# Patient Record
Sex: FEMALE | Race: WHITE
[De-identification: names, ages, dates, MRNs, and addresses within clinical notes are randomized per-mention and may not be internally consistent; named-entity substitution may affect disease eponyms.]

---

## 2018-02-26 ENCOUNTER — HOSPITAL ENCOUNTER (EMERGENCY)
Dept: HOSPITAL 74 - FER | Age: 18
Discharge: HOME | End: 2018-02-26
Payer: COMMERCIAL

## 2018-02-26 VITALS — SYSTOLIC BLOOD PRESSURE: 128 MMHG | HEART RATE: 66 BPM | DIASTOLIC BLOOD PRESSURE: 84 MMHG | TEMPERATURE: 98.8 F

## 2018-02-26 VITALS — BODY MASS INDEX: 51.5 KG/M2

## 2018-02-26 DIAGNOSIS — N93.8: Primary | ICD-10-CM

## 2018-02-26 LAB
COLOR UR: YELLOW
EPITH CASTS URNS QL MICRO: (no result) /HPF
HCG UR QL: NEGATIVE
PH UR: 5.5 [PH] (ref 4.5–8)
PROT UR QL STRIP: (no result)
RBC # BLD AUTO: >100 /HPF (ref 0–3)
RBC # UR STRIP: (no result) /UL
SP GR UR: 1.01 (ref 1–1.02)
UROBILINOGEN UR STRIP-MCNC: 0.2 MG/DL (ref 0.2–1)
WBC # UR AUTO: (no result) /UL (ref 0–5)

## 2018-02-26 NOTE — PDOC
History of Present Illness





- History of Present Illness


Initial Comments: 





02/26/18 15:41


The patient is a 17 year old female with no known past medical history who 

presents to the ED complaining of vaginal bleeding that began today. The 

patient states she completed her most recent menstrual period. Today, she "woke 

up in a pool of blood". States that she soaked through her pad and noted a 

large blood clot. She also reports associated mild suprapubic cramping that is 

consistent with previous periods. No fever or chills. No nausea, vomiting, or 

diarrhea. No urinary complaints. She states she does have a history of 

irregular periods. She is sexually active with one partner and uses condoms.





<Doris Buckner - Last Filed: 02/26/18 17:49>





- General


History Source: Patient


Exam Limitations: No Limitations





<Aleksandar Junior - Last Filed: 02/26/18 17:57>





- General


Chief Complaint: Vaginal Bleeding


Stated Complaint: VAGINAL BLEEDING


Time Seen by Provider: 02/26/18 14:29





Past History





<Doris Buckner - Last Filed: 02/26/18 17:49>





- Past Medical History


COPD: No





- Reproductive History


Is Patient Pregnant Now?: No





- Immunization History


Immunization Up to Date: Yes





- Suicide/Smoking/Psychosocial Hx


Smoking History: Never smoked


Hx Alcohol Use: Yes (OCCASIONAL)


Drug/Substance Use Hx: No


Substance Use Type: None





<Aleksandar Junior - Last Filed: 02/26/18 17:57>





- Past Medical History


Allergies/Adverse Reactions: 


 Allergies











Allergy/AdvReac Type Severity Reaction Status Date / Time


 


No Known Allergies Allergy   Verified 02/26/18 15:35











Home Medications: 


Ambulatory Orders





NK [No Known Home Medication]  02/26/18 











**Review of Systems





- Review of Systems


Able to Perform ROS?: Yes


Comments:: 





02/26/18 15:49


GENERAL/CONSTITUTIONAL: No fever or chills. No weakness.


HEAD, EYES, EARS, NOSE AND THROAT: No change in vision. No ear pain or 

discharge. No sore throat.


CARDIOVASCULAR: No chest pain or shortness of breath.


RESPIRATORY: No cough, wheezing, or hemoptysis.


GASTROINTESTINAL: No nausea, vomiting, diarrhea or constipation.


GENITOURINARY: +Vaginal bleeding x 1 day. Mild suprapubic ttp. No dysuria, 

frequency, or change in urination.


MUSCULOSKELETAL: No joint or muscle swelling or pain. No neck or back pain.


SKIN: No rash


NEUROLOGIC: No headache, vertigo, loss of consciousness, or change in strength/

sensation.


ENDOCRINE: No increased thirst. No abnormal weight change.


HEMATOLOGIC/LYMPHATIC: No anemia, easy bleeding, or history of blood clots.


ALLERGIC/IMMUNOLOGIC: No hives or skin allergy.








<Doris Buckner - Last Filed: 02/26/18 17:49>





*Physical Exam





- Vital Signs


 Last Vital Signs











Temp Pulse Resp BP Pulse Ox


 


 98.8 F   66   16   128/84   99 


 


 02/26/18 14:29  02/26/18 14:29  02/26/18 14:29  02/26/18 14:29  02/26/18 14:29














- Physical Exam


Comments: 








02/26/18 15:50





GENERAL: Awake, alert, and fully oriented, in no acute distress. Obese. 


HEAD: No signs of trauma


EYES: PERRLA, EOMI, sclera anicteric, conjunctiva clear


ENT: Auricles normal inspection, hearing grossly normal, nares patent, 

oropharynx clear without exudates. Moist mucosa


NECK: Normal ROM, supple, no lymphadenopathy, JVD, or masses


ABDOMEN: Soft, nontender, normoactive bowel sounds.  No guarding, no rebound.  

No masses


EXTREMITIES: Normal range of motion, no edema.  No clubbing or cyanosis. No 

cords, erythema, or tenderness


NEUROLOGICAL: Cranial nerves II through XII grossly intact.  Normal speech, 

normal gait


SKIN: Warm, Dry, normal turgor, no rashes or lesions noted.


PELVIC: Small amounts of blood in vaginal vault, no CMT or adnexal tenderness. 

Unable to reach cervical os. 





<Doris Buckner - Last Filed: 02/26/18 17:49>





- Vital Signs


 Last Vital Signs











Temp Pulse Resp BP Pulse Ox


 


 98.8 F   66   16   128/84   99 


 


 02/26/18 14:29  02/26/18 14:29  02/26/18 14:29  02/26/18 14:29  02/26/18 14:29














<Aleksandar Junior - Last Filed: 02/26/18 17:57>





ED Treatment Course





- ADDITIONAL ORDERS


Additional order review: 


 Laboratory  Results











  02/26/18





  15:00


 


Urine Color  Yellow


 


Urine Appearance  Slightly


 


Urine pH  5.5


 


Ur Specific Gravity  1.015


 


Urine Protein  1+ H


 


Urine Glucose (UA)  Negative


 


Urine Ketones  Negative


 


Urine Blood  3+ H


 


Urine Nitrite  Negative


 


Urine Bilirubin  Negative


 


Urine Urobilinogen  0.2


 


Ur Leukocyte Esterase  Negative


 


Urine RBC  >100


 


Urine WBC  3-5


 


Ur Epithelial Cells  3-5


 


Urine HCG, Qual  Negative














<Doris Buckner - Last Filed: 02/26/18 17:49>





- ADDITIONAL ORDERS


Additional order review: 


 Laboratory  Results











  02/26/18





  15:00


 


Urine Color  Yellow


 


Urine Appearance  Slightly


 


Urine pH  5.5


 


Ur Specific Gravity  1.015


 


Urine Protein  1+ H


 


Urine Glucose (UA)  Negative


 


Urine Ketones  Negative


 


Urine Blood  3+ H


 


Urine Nitrite  Negative


 


Urine Bilirubin  Negative


 


Urine Urobilinogen  0.2


 


Ur Leukocyte Esterase  Negative


 


Urine RBC  >100


 


Urine WBC  3-5


 


Ur Epithelial Cells  3-5


 


Urine HCG, Qual  Negative














- RADIOLOGY


Radiology Studies Ordered: 














 Category Date Time Status


 


 PELVIC / BLADDER US [US] Routine Ultrasound  02/26/18 Ordered


 


 TRANSVAGINAL ULTRASOUND US [US] Stat Ultrasound  02/26/18 15:21 Ordered














<Aleksandar Junior - Last Filed: 02/26/18 17:57>





Medical Decision Making





- Medical Decision Making








02/26/18 17:49


Pelvic and Transvaginal US. 


Read and reviewed by Dr. Zafar. 


Impression: Left ovarian cysts and free fluid. 





<Doris Buckner - Last Filed: 02/26/18 17:49>





- Medical Decision Making





02/26/18 15:34





A portion of this note was documented by scribe services under my direction. I 

have reviewed the details of the note, within reason, and agree with the 

documentation with the following case summary and management plan written by 

me. 





Patient treated in the ED.





Nursing notes are reviewed and incorporated into the medical decision-making.


Vital signs reviewed.





Peripheral IV access obtained by the nurse, laboratory studies are drawn and 

sent, reviewed and interpreted by myself. 





 Vital Signs











Temp Pulse Resp BP Pulse Ox


 


 98.8 F   66   16   128/84   99 


 


 02/26/18 14:29  02/26/18 14:29  02/26/18 14:29  02/26/18 14:29  02/26/18 14:29








17-year-old female with past medical history obesity presents with vaginal 

bleeding. The patient has had irregular periods. She had recently finished her 

last menstrual period 3 days ago. Today, she noted heavy vaginal bleeding 

including a large blood clot. Reports uterine like cramp symptoms. Denies 

dysuria. Patient essentially active with her partner but wears condoms 

consistently.





The patient is not pregnant. This may be possible this is dysfunctional uterine 

bleeding. We'll obtain a transvaginal ultrasound and if the workup is 

unremarkable, we'll discharge patient with GYN follow-up.


02/26/18 17:53





 Urine Test Results











Urine Color  Yellow   02/26/18  15:00    


 


Urine Appearance  Slightly   02/26/18  15:00    


 


Urine pH  5.5  (4.5-8)   02/26/18  15:00    


 


Ur Specific Gravity  1.015  (1.005-1.025)   02/26/18  15:00    


 


Urine Protein  1+  (NEGATIVE)  H  02/26/18  15:00    


 


Urine Glucose (UA)  Negative  (NEGATIVE)   02/26/18  15:00    


 


Urine Ketones  Negative  (NEGATIVE)   02/26/18  15:00    


 


Urine Blood  3+  (NEGATIVE)  H  02/26/18  15:00    


 


Urine Nitrite  Negative  (NEGATIVE)   02/26/18  15:00    


 


Urine Bilirubin  Negative  (NEGATIVE)   02/26/18  15:00    


 


Ur Leukocyte Esterase  Negative  (NEGATIVE)   02/26/18  15:00    


 


Urine RBC  >100 /hpf (0-3)   02/26/18  15:00    


 


Urine WBC  3-5  (0-5)   02/26/18  15:00    


 


Ur Epithelial Cells  3-5 /HPF  02/26/18  15:00    








Pregnancy test negative.


Transvaginal ultrasound shows ovarian cysts.


The patient is comfortable and feels reassured.


I had explained that she will need follow up with ob/gyn.


Discharge diagnosis: ovarian cysts, dysfunction uterine bleeding.





I discussed the physical exam findings, ancillary test results and final 

diagnoses with the patient.  I answered all of the patient's questions.  The 

patient was satisfied with the care received and felt comfortable with the 

discharge plan and treatment plan.  The patient will call their primary care 

physician within 24 hours to arrange follow-up and will return to the Emergency 

Department with any new, persistant or worsening symptoms. 





<Aleksandar Junior - Last Filed: 02/26/18 17:57>





*DC/Admit/Observation/Transfer





- Attestations


Scribe Attestion: 





02/26/18 15:50





Documentation prepared by Doris Buckner, acting as medical scribe for Aleksandar Junior MD.





<Doris Buckner - Last Filed: 02/26/18 17:49>





- Discharge Dispostion


Admit: No





<Aleksandar Junior - Last Filed: 02/26/18 17:57>


Diagnosis at time of Disposition: 


 Dysfunctional uterine bleeding








- Discharge Dispostion


Disposition: HOME


Condition at time of disposition: Good





- Referrals


Referrals: 


George Martinez MD [Staff Physician] - 


Mary Milan MD [Staff Physician] - 





- Patient Instructions


Printed Discharge Instructions:  DI for Abnormal Uterine Bleeding, DI for 

Ovarian Cyst


Additional Instructions: 


You may take 600 mg motrin (ibuprofen) every 6 hours as needed for pain.


You are not pregnant. 


Please follow up with a GYN doctor. Call to schedule an appointment.

## 2020-07-31 ENCOUNTER — APPOINTMENT (OUTPATIENT)
Dept: SURGERY | Facility: CLINIC | Age: 20
End: 2020-07-31
Payer: MEDICAID

## 2020-07-31 VITALS
SYSTOLIC BLOOD PRESSURE: 102 MMHG | WEIGHT: 293 LBS | TEMPERATURE: 97.6 F | DIASTOLIC BLOOD PRESSURE: 80 MMHG | HEART RATE: 65 BPM | HEIGHT: 65 IN | BODY MASS INDEX: 48.82 KG/M2

## 2020-07-31 DIAGNOSIS — R51 HEADACHE: ICD-10-CM

## 2020-07-31 DIAGNOSIS — M54.5 LOW BACK PAIN: ICD-10-CM

## 2020-07-31 DIAGNOSIS — Z78.9 OTHER SPECIFIED HEALTH STATUS: ICD-10-CM

## 2020-07-31 PROCEDURE — 99204 OFFICE O/P NEW MOD 45 MIN: CPT

## 2020-07-31 RX ORDER — ALBUTEROL SULFATE 90 UG/1
INHALANT RESPIRATORY (INHALATION) EVERY 4 HOURS
Refills: 0 | Status: ACTIVE | COMMUNITY

## 2020-08-06 NOTE — ASSESSMENT
[FreeTextEntry1] : ELISE CUEVAS is a 19 year female seen today for Bariatric consultation. The surgical options for weight loss have been extensively discussed with the patient and questions answered. The patient was provided written  education regarding the sleeve gastrectomy. The patient appears to have a reasonable understanding of the process and is ready to proceed.  Risks, including but not limited to:  anesthesia, death, bleeding, infection, leaks, blood clots, ulcers, malnutrition and need for additional operations have been reviewed.  The importance of a consistent diet and exercise regimen in regards to weight loss and maintenance has also been discussed and the patient agrees to actively participate in the process.  The importance of lifelong mineral and vitamin supplementation was also reviewed with the patient. The need to adhere to an appropriate diet and exercise regimen were emphasized; in particular the need to perform moderate to intense physical activity most days of the week.  No promises have been made in regards to any given outcome and possibility of inadequate weight loss as well as regain has been discussed with the patient.  The patient understands that a long-term commitment is necessary for long-term success.\par

## 2020-08-06 NOTE — PHYSICAL EXAM
[Normal] : affect appropriate [de-identified] : Mallampati II [de-identified] : Soft, nondistended.

## 2020-08-06 NOTE — PLAN
[FreeTextEntry1] : PLAN: Education class.\par            Nutritional evaluation.\par            Call with concerns.

## 2020-08-06 NOTE — CONSULT LETTER
[Dear  ___] : Dear  [unfilled], [Courtesy Letter:] : I had the pleasure of seeing your patient, [unfilled], in my office today. [Please see my note below.] : Please see my note below. [Consult Closing:] : Thank you very much for allowing me to participate in the care of this patient.  If you have any questions, please do not hesitate to contact me. [Sincerely,] : Sincerely, [FreeTextEntry3] : Kristina Rosado MD, FACS, FASMBS, Diplo ABOM\par Director, [facility]\par Director, Medical [facility]\par Chief, General, Bariatric & Minimally Invasive Surgery\par Director, Quality & Performance Improvement\par Department of Surgery\par [facility] \par [facility]\par

## 2020-08-06 NOTE — REASON FOR VISIT
[Initial Consult] : an initial consult for [Morbid Obesity (BMI>40)] : morbid obesity (bmi>40) [FreeTextEntry2] : Patient presents for bariatric consultation

## 2020-08-06 NOTE — HISTORY OF PRESENT ILLNESS
[de-identified] : 19 year old female with a BMI of 54 who presents today for Bariatric consultation. She has tried multiple weight loss modalities in the past such as portion control and approximately 1 month ago she was placed on a 1500 k/katherine day diet by her nutritionist to help her with her weight loss goals. She is also exercising daily for 30 minutes using an exercise application. \par \par \par

## 2020-09-02 ENCOUNTER — APPOINTMENT (OUTPATIENT)
Dept: SURGERY | Facility: CLINIC | Age: 20
End: 2020-09-02

## 2020-09-03 ENCOUNTER — APPOINTMENT (OUTPATIENT)
Dept: SURGERY | Facility: CLINIC | Age: 20
End: 2020-09-03

## 2020-09-16 ENCOUNTER — APPOINTMENT (OUTPATIENT)
Dept: SURGERY | Facility: CLINIC | Age: 20
End: 2020-09-16

## 2020-09-23 ENCOUNTER — APPOINTMENT (OUTPATIENT)
Dept: SURGERY | Facility: CLINIC | Age: 20
End: 2020-09-23
Payer: SELF-PAY

## 2020-09-23 PROCEDURE — SI006: CPT

## 2020-09-24 ENCOUNTER — APPOINTMENT (OUTPATIENT)
Dept: SURGERY | Facility: CLINIC | Age: 20
End: 2020-09-24

## 2020-09-24 ENCOUNTER — OUTPATIENT (OUTPATIENT)
Dept: OUTPATIENT SERVICES | Facility: HOSPITAL | Age: 20
LOS: 1 days | Discharge: HOME | End: 2020-09-24

## 2020-09-24 DIAGNOSIS — F50.9 EATING DISORDER, UNSPECIFIED: ICD-10-CM

## 2020-10-01 DIAGNOSIS — F50.9 EATING DISORDER, UNSPECIFIED: ICD-10-CM

## 2020-10-05 ENCOUNTER — APPOINTMENT (OUTPATIENT)
Dept: SURGERY | Facility: CLINIC | Age: 20
End: 2020-10-05

## 2020-11-05 ENCOUNTER — APPOINTMENT (OUTPATIENT)
Dept: SURGERY | Facility: CLINIC | Age: 20
End: 2020-11-05
Payer: MEDICAID

## 2020-11-05 VITALS — WEIGHT: 293 LBS | HEIGHT: 65 IN | BODY MASS INDEX: 48.82 KG/M2

## 2020-11-05 PROCEDURE — ZZZZZ: CPT

## 2020-11-19 ENCOUNTER — TRANSCRIPTION ENCOUNTER (OUTPATIENT)
Age: 20
End: 2020-11-19

## 2020-12-14 ENCOUNTER — APPOINTMENT (OUTPATIENT)
Dept: SURGERY | Facility: CLINIC | Age: 20
End: 2020-12-14
Payer: MEDICAID

## 2020-12-14 VITALS — HEIGHT: 65 IN | WEIGHT: 293 LBS | BODY MASS INDEX: 48.82 KG/M2

## 2020-12-14 PROCEDURE — ZZZZZ: CPT

## 2021-01-29 ENCOUNTER — APPOINTMENT (OUTPATIENT)
Dept: SURGERY | Facility: CLINIC | Age: 21
End: 2021-01-29
Payer: MEDICAID

## 2021-01-29 VITALS — HEIGHT: 65 IN | BODY MASS INDEX: 48.82 KG/M2 | WEIGHT: 293 LBS

## 2021-01-29 PROCEDURE — ZZZZZ: CPT

## 2021-02-16 ENCOUNTER — APPOINTMENT (OUTPATIENT)
Dept: SURGERY | Facility: CLINIC | Age: 21
End: 2021-02-16
Payer: MEDICAID

## 2021-02-16 VITALS — BODY MASS INDEX: 48.82 KG/M2 | HEIGHT: 65 IN | WEIGHT: 293 LBS

## 2021-02-16 DIAGNOSIS — Z78.9 OTHER SPECIFIED HEALTH STATUS: ICD-10-CM

## 2021-02-16 PROCEDURE — ZZZZZ: CPT

## 2021-02-24 ENCOUNTER — NON-APPOINTMENT (OUTPATIENT)
Age: 21
End: 2021-02-24

## 2021-03-18 ENCOUNTER — APPOINTMENT (OUTPATIENT)
Dept: SURGERY | Facility: CLINIC | Age: 21
End: 2021-03-18
Payer: MEDICAID

## 2021-03-18 VITALS — HEIGHT: 65 IN | BODY MASS INDEX: 48.82 KG/M2 | WEIGHT: 293 LBS

## 2021-03-18 PROCEDURE — ZZZZZ: CPT

## 2021-04-19 ENCOUNTER — NON-APPOINTMENT (OUTPATIENT)
Age: 21
End: 2021-04-19

## 2021-04-19 DIAGNOSIS — R16.0 HEPATOMEGALY, NOT ELSEWHERE CLASSIFIED: ICD-10-CM

## 2021-04-29 ENCOUNTER — APPOINTMENT (OUTPATIENT)
Dept: SURGERY | Facility: CLINIC | Age: 21
End: 2021-04-29
Payer: MEDICAID

## 2021-04-29 VITALS — WEIGHT: 293 LBS | BODY MASS INDEX: 48.82 KG/M2 | HEIGHT: 65 IN

## 2021-04-29 PROCEDURE — ZZZZZ: CPT

## 2021-08-17 ENCOUNTER — OUTPATIENT (OUTPATIENT)
Dept: OUTPATIENT SERVICES | Facility: HOSPITAL | Age: 21
LOS: 1 days | Discharge: HOME | End: 2021-08-17
Payer: MEDICAID

## 2021-08-17 DIAGNOSIS — E66.01 MORBID (SEVERE) OBESITY DUE TO EXCESS CALORIES: ICD-10-CM

## 2021-08-17 PROCEDURE — 74240 X-RAY XM UPR GI TRC 1CNTRST: CPT | Mod: 26

## 2021-09-23 ENCOUNTER — APPOINTMENT (OUTPATIENT)
Dept: SURGERY | Facility: CLINIC | Age: 21
End: 2021-09-23
Payer: SELF-PAY

## 2021-09-23 VITALS
HEIGHT: 65 IN | TEMPERATURE: 97.8 F | DIASTOLIC BLOOD PRESSURE: 76 MMHG | WEIGHT: 293 LBS | HEART RATE: 77 BPM | BODY MASS INDEX: 48.82 KG/M2 | OXYGEN SATURATION: 98 % | SYSTOLIC BLOOD PRESSURE: 122 MMHG

## 2021-09-23 PROCEDURE — 99213 OFFICE O/P EST LOW 20 MIN: CPT

## 2021-09-23 NOTE — HISTORY OF PRESENT ILLNESS
[de-identified] : Drea is preparing for bariatric surgery, specifically laparoscopic sleeve gastrectomy.  She was diagnosed with H pylori via a stool antigen test and is currently taking antibiotics for that.  She had an MRI which showed focal nodular hyperplasia in her liver.

## 2021-09-23 NOTE — PLAN
[FreeTextEntry1] : Pt needs hematology visit for anemia.  She was told to take iron but has not started yet.\par Needs EGD\par F/u with our nutritionist for weight gain\par

## 2021-10-08 ENCOUNTER — APPOINTMENT (OUTPATIENT)
Dept: HEMATOLOGY ONCOLOGY | Facility: CLINIC | Age: 21
End: 2021-10-08
Payer: MEDICAID

## 2021-10-08 ENCOUNTER — LABORATORY RESULT (OUTPATIENT)
Age: 21
End: 2021-10-08

## 2021-10-08 VITALS
RESPIRATION RATE: 14 BRPM | WEIGHT: 293 LBS | BODY MASS INDEX: 48.82 KG/M2 | SYSTOLIC BLOOD PRESSURE: 119 MMHG | DIASTOLIC BLOOD PRESSURE: 57 MMHG | HEIGHT: 65 IN | TEMPERATURE: 98.4 F | HEART RATE: 73 BPM

## 2021-10-08 LAB
HCT VFR BLD CALC: 35.6 %
HGB BLD-MCNC: 10.9 G/DL
MCHC RBC-ENTMCNC: 23.9 PG
MCHC RBC-ENTMCNC: 30.6 G/DL
MCV RBC AUTO: 78.1 FL
PLATELET # BLD AUTO: 219 K/UL
PMV BLD: 11.2 FL
RBC # BLD: 4.56 M/UL
RBC # FLD: 15 %
RETICS # AUTO: 1.3 %
RETICS AGGREG/RBC NFR: 58.8 K/UL
WBC # FLD AUTO: 8.22 K/UL

## 2021-10-08 PROCEDURE — 99214 OFFICE O/P EST MOD 30 MIN: CPT

## 2021-10-09 LAB
FERRITIN SERPL-MCNC: 9 NG/ML
VIT B12 SERPL-MCNC: 446 PG/ML

## 2021-10-09 NOTE — HISTORY OF PRESENT ILLNESS
[de-identified] : 20 year old white female referred for hematologic evaluation for history of anemia . PMH is significant for morbid obesity , asthma , hepatomegaly with focal nodular hyperplasia ,  she is referred for evaluation prior to gastric sleeve resection . She has mild microcytic anemia noted on routine blood work ( Hgb 10 ) , she reports moderate menstrual bleeding . she denies hematochezia . She was previously on iron gummies , no recent iron intake . She tested positive for H pylori and is awaiting EGD.

## 2021-10-09 NOTE — ASSESSMENT
[FreeTextEntry1] : 20 year old female with mild microcytic anemia likely from iron deficiency referred for treatment prior to elective bariatric surgery . \par Positive H pylori . \par Plan : will check ferritin and start on iron infusions as needed to correct her anemia prior to surgery .She was apprised of potential adverse effects mainly mild hypersensitivity .

## 2021-10-14 LAB — METHYLMALONATE SERPL-SCNC: 104 NMOL/L

## 2021-10-18 ENCOUNTER — APPOINTMENT (OUTPATIENT)
Dept: GASTROENTEROLOGY | Facility: CLINIC | Age: 21
End: 2021-10-18
Payer: COMMERCIAL

## 2021-10-18 PROCEDURE — 99203 OFFICE O/P NEW LOW 30 MIN: CPT

## 2021-10-18 NOTE — ASSESSMENT
[FreeTextEntry1] : 19 yo morbidly obese F on a bariatric surgery program HP + then - after treatment. FNH on liver also has anemia. Unclear if INO. If Ino may consider colonoscopy (if recommended by hematology)\par \par Plan:\par EGD prior to bariatric surgery\par Follow up after

## 2021-10-18 NOTE — HISTORY OF PRESENT ILLNESS
[Home] : at home, [unfilled] , at the time of the visit. [Verbal consent obtained from patient] : the patient, [unfilled] [FreeTextEntry4] : PHILIP [de-identified] : 21 yo morbidly obese F on a bariatric surgery program HP + then - after treatment. FNH on liver also has anemia. Unclear if INO. Denies pain hematemesis, hematochezia, melena.\par FMHx: CRC in maternal grandma at 75\par \par No complaint

## 2021-10-20 ENCOUNTER — APPOINTMENT (OUTPATIENT)
Dept: INFUSION THERAPY | Facility: CLINIC | Age: 21
End: 2021-10-20

## 2021-10-20 RX ORDER — IRON SUCROSE 20 MG/ML
200 INJECTION, SOLUTION INTRAVENOUS ONCE
Refills: 0 | Status: COMPLETED | OUTPATIENT
Start: 2021-10-20 | End: 2021-10-20

## 2021-10-20 RX ADMIN — IRON SUCROSE 110 MILLIGRAM(S): 20 INJECTION, SOLUTION INTRAVENOUS at 16:31

## 2021-10-27 ENCOUNTER — APPOINTMENT (OUTPATIENT)
Dept: INFUSION THERAPY | Facility: CLINIC | Age: 21
End: 2021-10-27

## 2021-10-27 VITALS
TEMPERATURE: 97 F | RESPIRATION RATE: 18 BRPM | SYSTOLIC BLOOD PRESSURE: 122 MMHG | HEART RATE: 68 BPM | DIASTOLIC BLOOD PRESSURE: 73 MMHG

## 2021-10-27 RX ORDER — IRON SUCROSE 20 MG/ML
200 INJECTION, SOLUTION INTRAVENOUS ONCE
Refills: 0 | Status: COMPLETED | OUTPATIENT
Start: 2021-10-27 | End: 2021-10-27

## 2021-10-27 RX ADMIN — IRON SUCROSE 200 MILLIGRAM(S): 20 INJECTION, SOLUTION INTRAVENOUS at 13:50

## 2021-10-27 RX ADMIN — IRON SUCROSE 110 MILLIGRAM(S): 20 INJECTION, SOLUTION INTRAVENOUS at 13:20

## 2021-11-03 ENCOUNTER — APPOINTMENT (OUTPATIENT)
Dept: INFUSION THERAPY | Facility: CLINIC | Age: 21
End: 2021-11-03

## 2021-11-03 ENCOUNTER — OUTPATIENT (OUTPATIENT)
Dept: OUTPATIENT SERVICES | Facility: HOSPITAL | Age: 21
LOS: 1 days | Discharge: HOME | End: 2021-11-03

## 2021-11-03 DIAGNOSIS — D64.9 ANEMIA, UNSPECIFIED: ICD-10-CM

## 2021-11-03 RX ORDER — IRON SUCROSE 20 MG/ML
200 INJECTION, SOLUTION INTRAVENOUS ONCE
Refills: 0 | Status: COMPLETED | OUTPATIENT
Start: 2021-11-03 | End: 2021-11-03

## 2021-11-03 RX ADMIN — IRON SUCROSE 110 MILLIGRAM(S): 20 INJECTION, SOLUTION INTRAVENOUS at 14:17

## 2021-11-17 ENCOUNTER — APPOINTMENT (OUTPATIENT)
Dept: SURGERY | Facility: CLINIC | Age: 21
End: 2021-11-17
Payer: MEDICAID

## 2021-11-17 VITALS
HEIGHT: 65 IN | OXYGEN SATURATION: 98 % | HEART RATE: 74 BPM | DIASTOLIC BLOOD PRESSURE: 78 MMHG | SYSTOLIC BLOOD PRESSURE: 118 MMHG | TEMPERATURE: 97.2 F | WEIGHT: 293 LBS | BODY MASS INDEX: 48.82 KG/M2

## 2021-11-17 PROCEDURE — ZZZZZ: CPT

## 2021-12-21 ENCOUNTER — OUTPATIENT (OUTPATIENT)
Dept: OUTPATIENT SERVICES | Facility: HOSPITAL | Age: 21
LOS: 1 days | Discharge: HOME | End: 2021-12-21

## 2021-12-21 ENCOUNTER — APPOINTMENT (OUTPATIENT)
Dept: NEUROPSYCHOLOGY | Facility: CLINIC | Age: 21
End: 2021-12-21

## 2021-12-21 DIAGNOSIS — F50.9 EATING DISORDER, UNSPECIFIED: ICD-10-CM

## 2021-12-22 DIAGNOSIS — F50.9 EATING DISORDER, UNSPECIFIED: ICD-10-CM

## 2022-01-08 ENCOUNTER — LABORATORY RESULT (OUTPATIENT)
Age: 22
End: 2022-01-08

## 2022-01-10 ENCOUNTER — TRANSCRIPTION ENCOUNTER (OUTPATIENT)
Age: 22
End: 2022-01-10

## 2022-01-10 ENCOUNTER — OUTPATIENT (OUTPATIENT)
Dept: OUTPATIENT SERVICES | Facility: HOSPITAL | Age: 22
LOS: 1 days | Discharge: HOME | End: 2022-01-10
Payer: MEDICAID

## 2022-01-10 ENCOUNTER — RESULT REVIEW (OUTPATIENT)
Age: 22
End: 2022-01-10

## 2022-01-10 VITALS
SYSTOLIC BLOOD PRESSURE: 108 MMHG | DIASTOLIC BLOOD PRESSURE: 60 MMHG | WEIGHT: 293 LBS | HEART RATE: 79 BPM | HEIGHT: 65 IN | RESPIRATION RATE: 18 BRPM | OXYGEN SATURATION: 98 % | TEMPERATURE: 98 F

## 2022-01-10 VITALS — DIASTOLIC BLOOD PRESSURE: 69 MMHG | SYSTOLIC BLOOD PRESSURE: 101 MMHG | RESPIRATION RATE: 17 BRPM | HEART RATE: 72 BPM

## 2022-01-10 DIAGNOSIS — Z90.89 ACQUIRED ABSENCE OF OTHER ORGANS: Chronic | ICD-10-CM

## 2022-01-10 PROCEDURE — 88312 SPECIAL STAINS GROUP 1: CPT | Mod: 26

## 2022-01-10 PROCEDURE — 43239 EGD BIOPSY SINGLE/MULTIPLE: CPT

## 2022-01-10 PROCEDURE — 88305 TISSUE EXAM BY PATHOLOGIST: CPT | Mod: 26

## 2022-01-10 NOTE — ASU DISCHARGE PLAN (ADULT/PEDIATRIC) - CARE PROVIDER_API CALL
Sarai Plascencia)  Gastroenterology; Internal Medicine  [address] [place] [id number]  Phone: [phone]  Fax: [phone]  Follow Up Time:

## 2022-01-10 NOTE — ASU PATIENT PROFILE, ADULT - FALL HARM RISK - HARM RISK INTERVENTIONS

## 2022-01-10 NOTE — ASU DISCHARGE PLAN (ADULT/PEDIATRIC) - NS MD DC FALL RISK RISK
For information on Fall & Injury Prevention, visit: [email or web address] OR  [email or web address] OR  https://www.cdc.gov/steadi/patient.html

## 2022-01-12 ENCOUNTER — NON-APPOINTMENT (OUTPATIENT)
Age: 22
End: 2022-01-12

## 2022-01-12 LAB — SURGICAL PATHOLOGY STUDY: SIGNIFICANT CHANGE UP

## 2022-01-14 DIAGNOSIS — K29.50 UNSPECIFIED CHRONIC GASTRITIS WITHOUT BLEEDING: ICD-10-CM

## 2022-01-14 DIAGNOSIS — D64.9 ANEMIA, UNSPECIFIED: ICD-10-CM

## 2022-01-14 DIAGNOSIS — K29.80 DUODENITIS WITHOUT BLEEDING: ICD-10-CM

## 2022-01-14 DIAGNOSIS — B96.81 HELICOBACTER PYLORI [H. PYLORI] AS THE CAUSE OF DISEASES CLASSIFIED ELSEWHERE: ICD-10-CM

## 2022-01-14 DIAGNOSIS — E66.9 OBESITY, UNSPECIFIED: ICD-10-CM

## 2022-01-19 ENCOUNTER — APPOINTMENT (OUTPATIENT)
Dept: SURGERY | Facility: CLINIC | Age: 22
End: 2022-01-19
Payer: SELF-PAY

## 2022-01-19 VITALS
OXYGEN SATURATION: 98 % | HEIGHT: 65 IN | HEART RATE: 98 BPM | BODY MASS INDEX: 48.82 KG/M2 | WEIGHT: 293 LBS | SYSTOLIC BLOOD PRESSURE: 110 MMHG | TEMPERATURE: 97.5 F | DIASTOLIC BLOOD PRESSURE: 74 MMHG

## 2022-01-19 PROCEDURE — 99213 OFFICE O/P EST LOW 20 MIN: CPT

## 2022-01-19 NOTE — PLAN
[FreeTextEntry1] : Will repeat labs, as the most recent are form last year and she has since completed Venofer infusions.\par F/u with Dr. Mckeon for persistent H pylori\par Pt will need to use the hospital PAP equipment as per her pulmonologist

## 2022-01-19 NOTE — HISTORY OF PRESENT ILLNESS
[de-identified] : Drea has lost 5 pounds since her last visit with us.  She completed her iron infusions.  She had an upper endoscopy and the pathology revealed H pylori, despite the fact that she completed a course of po Abx for stool (+) H pylori a few months ago.  She has a f/u appointment with Dr. Mckeon in February.  She repeated her psychology evaluation...the completed evaluation is pending.

## 2022-02-10 ENCOUNTER — APPOINTMENT (OUTPATIENT)
Dept: GASTROENTEROLOGY | Facility: CLINIC | Age: 22
End: 2022-02-10
Payer: MEDICAID

## 2022-02-10 DIAGNOSIS — A04.8 OTHER SPECIFIED BACTERIAL INTESTINAL INFECTIONS: ICD-10-CM

## 2022-02-10 PROCEDURE — 99443: CPT

## 2022-02-22 RX ORDER — ERGOCALCIFEROL 1.25 MG/1
1.25 MG CAPSULE, LIQUID FILLED ORAL
Qty: 4 | Refills: 1 | Status: ACTIVE | COMMUNITY
Start: 2022-02-22 | End: 1900-01-01

## 2022-03-06 ENCOUNTER — NON-APPOINTMENT (OUTPATIENT)
Age: 22
End: 2022-03-06

## 2022-03-09 PROBLEM — A04.8 HELICOBACTER PYLORI (H. PYLORI) INFECTION: Status: ACTIVE | Noted: 2021-09-23

## 2022-03-09 NOTE — HISTORY OF PRESENT ILLNESS
[Home] : at home, [unfilled] , at the time of the visit. [Medical Office: ([facility])___] : at the medical office located in  [Verbal consent obtained from patient] : the patient, [unfilled] [___ Month(s) Ago] : [unfilled] month(s) ago [_________] : Performed [unfilled] [FreeTextEntry4] : PHILIP [de-identified] : SP EGD prior to bariatric surgery. (+HP) on Bx [de-identified] : 20 yo morbidly obese F on a bariatric surgery program HP + then - after treatment. FNH on liver also has anemia. Unclear if INO. Denies pain hematemesis, hematochezia, melena.\par FMHx: CRC in maternal grandma at 75\par \par No complaint

## 2022-03-09 NOTE — ASSESSMENT
[FreeTextEntry1] : 22 yo morbidly obese F on a bariatric surgery program \par Resistant HP on repeat EGD\par FNH\par \par Plan:\par Will treat HP using Bismuth quadruple therapy\par Check HP Stool Ag then follow up

## 2022-03-10 LAB — H PYLORI AG STL QL: DETECTED

## 2022-04-28 ENCOUNTER — LABORATORY RESULT (OUTPATIENT)
Age: 22
End: 2022-04-28

## 2022-04-28 ENCOUNTER — APPOINTMENT (OUTPATIENT)
Dept: HEMATOLOGY ONCOLOGY | Facility: CLINIC | Age: 22
End: 2022-04-28
Payer: MEDICAID

## 2022-04-28 VITALS
DIASTOLIC BLOOD PRESSURE: 89 MMHG | BODY MASS INDEX: 48.82 KG/M2 | HEART RATE: 103 BPM | TEMPERATURE: 98.4 F | SYSTOLIC BLOOD PRESSURE: 135 MMHG | OXYGEN SATURATION: 98 % | RESPIRATION RATE: 18 BRPM | WEIGHT: 293 LBS | HEIGHT: 65 IN

## 2022-04-28 DIAGNOSIS — Z00.00 ENCOUNTER FOR GENERAL ADULT MEDICAL EXAMINATION W/OUT ABNORMAL FINDINGS: ICD-10-CM

## 2022-04-28 DIAGNOSIS — R79.89 OTHER SPECIFIED ABNORMAL FINDINGS OF BLOOD CHEMISTRY: ICD-10-CM

## 2022-04-28 PROCEDURE — 99213 OFFICE O/P EST LOW 20 MIN: CPT

## 2022-04-29 PROBLEM — R79.89 LOW VITAMIN D LEVEL: Status: ACTIVE | Noted: 2022-02-22

## 2022-04-30 LAB
FERRITIN SERPL-MCNC: 23 NG/ML
HCT VFR BLD CALC: 35.7 %
HGB BLD-MCNC: 11.1 G/DL
IRON SATN MFR SERPL: 7 %
IRON SERPL-MCNC: 23 UG/DL
MCHC RBC-ENTMCNC: 24.6 PG
MCHC RBC-ENTMCNC: 31.1 G/DL
MCV RBC AUTO: 79.2 FL
PLATELET # BLD AUTO: 240 K/UL
PMV BLD: 11.6 FL
RBC # BLD: 4.51 M/UL
RBC # FLD: 14.6 %
TIBC SERPL-MCNC: 326 UG/DL
UIBC SERPL-MCNC: 303 UG/DL
WBC # FLD AUTO: 10.49 K/UL

## 2022-05-02 NOTE — ASSESSMENT
[FreeTextEntry1] : 20 year old female with mild microcytic anemia likely from iron deficiency referred for treatment prior to elective bariatric surgery . \par Positive H pylori . \par Plan : will schedule for Feraheme weekly x2 \par           Recheck CBC and ferritin level post infusion.  \par         She was apprised of potential adverse effects mainly mild hypersensitivity . \par      Follow up with GI and general surgery for  Gastric sleeve surgery. \par Patient seen and evaluated by DR. Clemente who agreed for the above plan of care.

## 2022-05-02 NOTE — HISTORY OF PRESENT ILLNESS
[de-identified] : [birth year]:\par Patient follow up today for hematologic evaluation for history of Anemia . PMH is significant for morbid obesity , asthma , hepatomegaly with focal nodular hyperplasia ,  she is referred for evaluation prior to gastric sleeve resection . \par CBC reviewed HGB/ HCT 11.1/ 35.7 and normal platelet count is 240,000\par Serum IRON is 7%, We will schedule patient to start Feraheme next week for 2 doses. \par Patient cleared by GI. \par  [de-identified] : 20 year old white female referred for hematologic evaluation for history of anemia . PMH is significant for morbid obesity , asthma , hepatomegaly with focal nodular hyperplasia ,  she is referred for evaluation prior to gastric sleeve resection . She has mild microcytic anemia noted on routine blood work ( Hgb 10 ) , she reports moderate menstrual bleeding . she denies hematochezia . She was previously on iron gummies , no recent iron intake . She tested positive for H pylori and is awaiting EGD.

## 2022-05-10 ENCOUNTER — APPOINTMENT (OUTPATIENT)
Dept: INFUSION THERAPY | Facility: CLINIC | Age: 22
End: 2022-05-10

## 2022-05-10 RX ORDER — IRON SUCROSE 20 MG/ML
200 INJECTION, SOLUTION INTRAVENOUS ONCE
Refills: 0 | Status: COMPLETED | OUTPATIENT
Start: 2022-05-10 | End: 2022-05-10

## 2022-05-10 RX ADMIN — IRON SUCROSE 110 MILLIGRAM(S): 20 INJECTION, SOLUTION INTRAVENOUS at 15:52

## 2022-05-13 ENCOUNTER — APPOINTMENT (OUTPATIENT)
Dept: INFUSION THERAPY | Facility: CLINIC | Age: 22
End: 2022-05-13

## 2022-05-13 RX ORDER — IRON SUCROSE 20 MG/ML
200 INJECTION, SOLUTION INTRAVENOUS ONCE
Refills: 0 | Status: COMPLETED | OUTPATIENT
Start: 2022-05-13 | End: 2022-05-13

## 2022-05-13 RX ADMIN — IRON SUCROSE 220 MILLIGRAM(S): 20 INJECTION, SOLUTION INTRAVENOUS at 15:47

## 2022-05-16 ENCOUNTER — APPOINTMENT (OUTPATIENT)
Dept: INFUSION THERAPY | Facility: CLINIC | Age: 22
End: 2022-05-16

## 2022-05-16 RX ORDER — IRON SUCROSE 20 MG/ML
200 INJECTION, SOLUTION INTRAVENOUS ONCE
Refills: 0 | Status: COMPLETED | OUTPATIENT
Start: 2022-05-16 | End: 2022-05-16

## 2022-05-16 RX ADMIN — IRON SUCROSE 220 MILLIGRAM(S): 20 INJECTION, SOLUTION INTRAVENOUS at 16:11

## 2022-05-16 RX ADMIN — IRON SUCROSE 200 MILLIGRAM(S): 20 INJECTION, SOLUTION INTRAVENOUS at 16:41

## 2022-05-17 ENCOUNTER — APPOINTMENT (OUTPATIENT)
Dept: INFUSION THERAPY | Facility: CLINIC | Age: 22
End: 2022-05-17

## 2022-05-18 ENCOUNTER — OUTPATIENT (OUTPATIENT)
Dept: OUTPATIENT SERVICES | Facility: HOSPITAL | Age: 22
LOS: 1 days | Discharge: HOME | End: 2022-05-18

## 2022-05-18 ENCOUNTER — APPOINTMENT (OUTPATIENT)
Dept: INFUSION THERAPY | Facility: CLINIC | Age: 22
End: 2022-05-18

## 2022-05-18 DIAGNOSIS — D64.9 ANEMIA, UNSPECIFIED: ICD-10-CM

## 2022-05-18 DIAGNOSIS — E66.01 MORBID (SEVERE) OBESITY DUE TO EXCESS CALORIES: ICD-10-CM

## 2022-05-18 DIAGNOSIS — Z90.89 ACQUIRED ABSENCE OF OTHER ORGANS: Chronic | ICD-10-CM

## 2022-05-18 DIAGNOSIS — R79.89 OTHER SPECIFIED ABNORMAL FINDINGS OF BLOOD CHEMISTRY: ICD-10-CM

## 2022-05-18 RX ORDER — IRON SUCROSE 20 MG/ML
200 INJECTION, SOLUTION INTRAVENOUS ONCE
Refills: 0 | Status: COMPLETED | OUTPATIENT
Start: 2022-05-18 | End: 2022-05-18

## 2022-05-18 RX ADMIN — IRON SUCROSE 200 MILLIGRAM(S): 20 INJECTION, SOLUTION INTRAVENOUS at 17:18

## 2022-05-18 RX ADMIN — IRON SUCROSE 220 MILLIGRAM(S): 20 INJECTION, SOLUTION INTRAVENOUS at 16:41

## 2022-05-19 ENCOUNTER — APPOINTMENT (OUTPATIENT)
Dept: GASTROENTEROLOGY | Facility: CLINIC | Age: 22
End: 2022-05-19

## 2022-05-24 ENCOUNTER — NON-APPOINTMENT (OUTPATIENT)
Age: 22
End: 2022-05-24

## 2022-06-01 ENCOUNTER — APPOINTMENT (OUTPATIENT)
Dept: SURGERY | Facility: CLINIC | Age: 22
End: 2022-06-01
Payer: MEDICAID

## 2022-06-01 VITALS
DIASTOLIC BLOOD PRESSURE: 70 MMHG | OXYGEN SATURATION: 98 % | WEIGHT: 293 LBS | HEART RATE: 102 BPM | HEIGHT: 65 IN | SYSTOLIC BLOOD PRESSURE: 110 MMHG | BODY MASS INDEX: 48.82 KG/M2 | TEMPERATURE: 97.3 F

## 2022-06-01 DIAGNOSIS — K76.89 OTHER SPECIFIED DISEASES OF LIVER: ICD-10-CM

## 2022-06-01 PROCEDURE — 99213 OFFICE O/P EST LOW 20 MIN: CPT

## 2022-06-01 NOTE — ASSESSMENT
[FreeTextEntry1] : 20 y/o female with Class 3 obesity preparing for laparoscopic sleeve gastrectomy.

## 2022-06-01 NOTE — HISTORY OF PRESENT ILLNESS
[de-identified] : Drea has completed all of her preoperative evaluations as she prepares for the laparoscopic sleeve gastrectomy. She is cleared by Hematology after receiving iron infusions for INO.  She is cleared by gastroenterology after being successfully treated for H pylori.  She is cleared by Pulmonary medicine...she has MATIAS but does not have a CPAP because her insurance will not cover it. She may need to use the hospital's CPAP.

## 2022-06-13 ENCOUNTER — OUTPATIENT (OUTPATIENT)
Dept: OUTPATIENT SERVICES | Facility: HOSPITAL | Age: 22
LOS: 1 days | Discharge: HOME | End: 2022-06-13
Payer: MEDICAID

## 2022-06-13 VITALS
HEIGHT: 65 IN | HEART RATE: 73 BPM | RESPIRATION RATE: 18 BRPM | WEIGHT: 293 LBS | OXYGEN SATURATION: 97 % | SYSTOLIC BLOOD PRESSURE: 130 MMHG | DIASTOLIC BLOOD PRESSURE: 86 MMHG | TEMPERATURE: 98 F

## 2022-06-13 DIAGNOSIS — M54.50 LOW BACK PAIN, UNSPECIFIED: ICD-10-CM

## 2022-06-13 DIAGNOSIS — G47.33 OBSTRUCTIVE SLEEP APNEA (ADULT) (PEDIATRIC): ICD-10-CM

## 2022-06-13 DIAGNOSIS — J45.909 UNSPECIFIED ASTHMA, UNCOMPLICATED: ICD-10-CM

## 2022-06-13 DIAGNOSIS — Z01.818 ENCOUNTER FOR OTHER PREPROCEDURAL EXAMINATION: ICD-10-CM

## 2022-06-13 DIAGNOSIS — Z90.89 ACQUIRED ABSENCE OF OTHER ORGANS: Chronic | ICD-10-CM

## 2022-06-13 DIAGNOSIS — E66.01 MORBID (SEVERE) OBESITY DUE TO EXCESS CALORIES: ICD-10-CM

## 2022-06-13 LAB
A1C WITH ESTIMATED AVERAGE GLUCOSE RESULT: 5.4 % — SIGNIFICANT CHANGE UP (ref 4–5.6)
ALBUMIN SERPL ELPH-MCNC: 4.4 G/DL — SIGNIFICANT CHANGE UP (ref 3.5–5.2)
ALP SERPL-CCNC: 45 U/L — SIGNIFICANT CHANGE UP (ref 30–115)
ALT FLD-CCNC: 16 U/L — SIGNIFICANT CHANGE UP (ref 0–41)
ANION GAP SERPL CALC-SCNC: 13 MMOL/L — SIGNIFICANT CHANGE UP (ref 7–14)
APTT BLD: 31.6 SEC — SIGNIFICANT CHANGE UP (ref 27–39.2)
AST SERPL-CCNC: 13 U/L — SIGNIFICANT CHANGE UP (ref 0–41)
BASOPHILS # BLD AUTO: 0.05 K/UL — SIGNIFICANT CHANGE UP (ref 0–0.2)
BASOPHILS NFR BLD AUTO: 0.5 % — SIGNIFICANT CHANGE UP (ref 0–1)
BILIRUB SERPL-MCNC: 0.4 MG/DL — SIGNIFICANT CHANGE UP (ref 0.2–1.2)
BLD GP AB SCN SERPL QL: SIGNIFICANT CHANGE UP
BUN SERPL-MCNC: 17 MG/DL — SIGNIFICANT CHANGE UP (ref 10–20)
CALCIUM SERPL-MCNC: 9.1 MG/DL — SIGNIFICANT CHANGE UP (ref 8.5–10.1)
CHLORIDE SERPL-SCNC: 103 MMOL/L — SIGNIFICANT CHANGE UP (ref 98–110)
CO2 SERPL-SCNC: 23 MMOL/L — SIGNIFICANT CHANGE UP (ref 17–32)
CREAT SERPL-MCNC: 0.8 MG/DL — SIGNIFICANT CHANGE UP (ref 0.7–1.5)
EGFR: 106 ML/MIN/1.73M2 — SIGNIFICANT CHANGE UP
EOSINOPHIL # BLD AUTO: 0.11 K/UL — SIGNIFICANT CHANGE UP (ref 0–0.7)
EOSINOPHIL NFR BLD AUTO: 1.1 % — SIGNIFICANT CHANGE UP (ref 0–8)
ESTIMATED AVERAGE GLUCOSE: 108 MG/DL — SIGNIFICANT CHANGE UP (ref 68–114)
GLUCOSE SERPL-MCNC: 71 MG/DL — SIGNIFICANT CHANGE UP (ref 70–99)
HCT VFR BLD CALC: 40.1 % — SIGNIFICANT CHANGE UP (ref 37–47)
HGB BLD-MCNC: 12.6 G/DL — SIGNIFICANT CHANGE UP (ref 12–16)
IMM GRANULOCYTES NFR BLD AUTO: 0.3 % — SIGNIFICANT CHANGE UP (ref 0.1–0.3)
INR BLD: 1.09 RATIO — SIGNIFICANT CHANGE UP (ref 0.65–1.3)
LYMPHOCYTES # BLD AUTO: 2.36 K/UL — SIGNIFICANT CHANGE UP (ref 1.2–3.4)
LYMPHOCYTES # BLD AUTO: 24.4 % — SIGNIFICANT CHANGE UP (ref 20.5–51.1)
MCHC RBC-ENTMCNC: 25.5 PG — LOW (ref 27–31)
MCHC RBC-ENTMCNC: 31.4 G/DL — LOW (ref 32–37)
MCV RBC AUTO: 81 FL — SIGNIFICANT CHANGE UP (ref 81–99)
MONOCYTES # BLD AUTO: 0.51 K/UL — SIGNIFICANT CHANGE UP (ref 0.1–0.6)
MONOCYTES NFR BLD AUTO: 5.3 % — SIGNIFICANT CHANGE UP (ref 1.7–9.3)
NEUTROPHILS # BLD AUTO: 6.63 K/UL — HIGH (ref 1.4–6.5)
NEUTROPHILS NFR BLD AUTO: 68.4 % — SIGNIFICANT CHANGE UP (ref 42.2–75.2)
NRBC # BLD: 0 /100 WBCS — SIGNIFICANT CHANGE UP (ref 0–0)
PLATELET # BLD AUTO: 201 K/UL — SIGNIFICANT CHANGE UP (ref 130–400)
POTASSIUM SERPL-MCNC: 4.4 MMOL/L — SIGNIFICANT CHANGE UP (ref 3.5–5)
POTASSIUM SERPL-SCNC: 4.4 MMOL/L — SIGNIFICANT CHANGE UP (ref 3.5–5)
PROT SERPL-MCNC: 7.4 G/DL — SIGNIFICANT CHANGE UP (ref 6–8)
PROTHROM AB SERPL-ACNC: 12.5 SEC — SIGNIFICANT CHANGE UP (ref 9.95–12.87)
RBC # BLD: 4.95 M/UL — SIGNIFICANT CHANGE UP (ref 4.2–5.4)
RBC # FLD: 16.3 % — HIGH (ref 11.5–14.5)
SODIUM SERPL-SCNC: 139 MMOL/L — SIGNIFICANT CHANGE UP (ref 135–146)
WBC # BLD: 9.69 K/UL — SIGNIFICANT CHANGE UP (ref 4.8–10.8)
WBC # FLD AUTO: 9.69 K/UL — SIGNIFICANT CHANGE UP (ref 4.8–10.8)

## 2022-06-13 PROCEDURE — 93010 ELECTROCARDIOGRAM REPORT: CPT

## 2022-06-13 NOTE — H&P PST ADULT - NSICDXPASTMEDICALHX_GEN_ALL_CORE_FT
PAST MEDICAL HISTORY:  Asthma last attack 2/22    Obesity, morbid     MATIAS (obstructive sleep apnea)

## 2022-06-13 NOTE — H&P PST ADULT - HISTORY OF PRESENT ILLNESS
pt with mo   for planned procedure to be healthier     PATIENT CURRENTLY DENIES CHEST PAIN  SHORTNESS OF BREATH  PALPITATIONS,  DYSURIA, OR UPPER RESPIRATORY INFECTION IN PAST 2 WEEKS  EXERCISE  TOLERANCE  1-2 FLIGHT OF STAIRS  WITHOUT SHORTNESS OF BREATH  12/27/21 + covid   denies travel outside the [place] in the past 30 days  Patient denies any signs or symptoms of COVID 19 and denies contact with known positive individuals.  They have an appointment for repeat COVID testing pre-procedure and acknowledge its time and place.  They were instructed to quarantine pre-procedure, practice exposure control measures, continue to self-monitor and report any concerns to their proceduralist.  pt advised self quarantine till day of procedure  Anesthesia Alert  Difficult Airway class IV  NO--History of neck surgery or radiation  NO--Limited ROM of neck  NO--History of Malignant hyperthermia  NO--No personal or family history of Pseudocholinesterase deficiency.  NO--Prior Anesthesia Complication  NO--Latex Allergy  NO--Loose teeth  NO--History of Rheumatoid Arthritis  NO--Bleeding risk  + MATIAS no machine  NO--Other_____    PT DENIES ANY RASHES, ABRASION, OR OPEN WOUNDS OR CUTS    AS PER THE PT, THIS IS HIS/HER COMPLETE MEDICAL AND SURGICAL HX, INCLUDING MEDICATIONS PRESCRIBED AND OVER THE COUNTER    Patient verbalized understanding of instructions and was given the opportunity to ask questions and have them answered.    pt denies any suicidal ideation or thoughts, pt states not a threat to self or others    E66.01,Z68.43,G47.33,J45.909,M54.50/4377    Low back pain    Uncomplicated asthma    Obstructive sleep apnea syndrome    Body mass index [BMI] 50.0-59.9, adult    Morbid or severe obesity due to excess calories    Encounter for other preprocedural examination    ^E66.01,Z68.43,G47.33,J45.909,M54.50/4377    Low back pain    Uncomplicated asthma    Obstructive sleep apnea syndrome    Body mass index [BMI] 50.0-59.9, adult    Morbid or severe obesity due to excess calories    Encounter for other preprocedural examination    Asthma    S/P tonsillectomy and adenoidectomy

## 2022-06-16 PROBLEM — J45.909 UNSPECIFIED ASTHMA, UNCOMPLICATED: Chronic | Status: ACTIVE | Noted: 2022-01-10

## 2022-06-16 PROBLEM — E66.01 MORBID (SEVERE) OBESITY DUE TO EXCESS CALORIES: Chronic | Status: ACTIVE | Noted: 2022-06-13

## 2022-06-16 PROBLEM — G47.33 OBSTRUCTIVE SLEEP APNEA (ADULT) (PEDIATRIC): Chronic | Status: ACTIVE | Noted: 2022-06-13

## 2022-06-20 RX ORDER — AMOXICILLIN 500 MG/1
500 CAPSULE ORAL TWICE DAILY
Qty: 56 | Refills: 0 | Status: DISCONTINUED | COMMUNITY
Start: 2022-03-10 | End: 2022-06-20

## 2022-06-20 RX ORDER — BISMUTH SUBSALICYLATE 262 MG/1
262 TABLET, CHEWABLE ORAL 4 TIMES DAILY
Qty: 112 | Refills: 0 | Status: DISCONTINUED | COMMUNITY
Start: 2022-02-10 | End: 2022-06-20

## 2022-06-20 RX ORDER — OMEPRAZOLE 20 MG/1
20 CAPSULE, DELAYED RELEASE ORAL TWICE DAILY
Qty: 60 | Refills: 0 | Status: DISCONTINUED | COMMUNITY
Start: 2022-02-10 | End: 2022-06-20

## 2022-06-20 RX ORDER — TETRACYCLINE HYDROCHLORIDE 500 MG/1
500 CAPSULE ORAL
Qty: 56 | Refills: 0 | Status: DISCONTINUED | COMMUNITY
Start: 2022-02-10 | End: 2022-06-20

## 2022-06-20 RX ORDER — RIFABUTIN 150 MG/1
150 CAPSULE ORAL DAILY
Qty: 28 | Refills: 0 | Status: DISCONTINUED | COMMUNITY
Start: 2022-03-10 | End: 2022-06-20

## 2022-06-20 RX ORDER — METRONIDAZOLE 500 MG/1
500 TABLET ORAL 3 TIMES DAILY
Qty: 42 | Refills: 0 | Status: DISCONTINUED | COMMUNITY
Start: 2022-02-10 | End: 2022-06-20

## 2022-06-22 ENCOUNTER — APPOINTMENT (OUTPATIENT)
Dept: SURGERY | Facility: CLINIC | Age: 22
End: 2022-06-22
Payer: MEDICAID

## 2022-06-22 VITALS
OXYGEN SATURATION: 98 % | BODY MASS INDEX: 48.82 KG/M2 | HEART RATE: 80 BPM | DIASTOLIC BLOOD PRESSURE: 70 MMHG | HEIGHT: 65 IN | SYSTOLIC BLOOD PRESSURE: 122 MMHG | TEMPERATURE: 97.8 F | WEIGHT: 293 LBS

## 2022-06-22 PROCEDURE — 99213 OFFICE O/P EST LOW 20 MIN: CPT

## 2022-06-22 NOTE — PLAN
[FreeTextEntry1] : Drea underwent extensive preoperative workup and is scheduled to have . She will benefit from surgery to improve her quality of life and for management of her comorbid conditions.\par At this preoperative visit, we discussed the risks and benefits of weight loss surgery. We specifically discussed the risks of anesthesia including cardiac and pulmonary complications, DVT/PE, pneumonia, leaks, infection, death, bleeding, ulcers, and need for additional operations have been reviewed. We discussed the importance of a consistent diet and exercise regimen in regards to weight loss and maintenance as well. The importance of lifelong mineral and vitamin supplementation was also reviewed with the patient. The patient was given ample opportunity to ask questions and all questions were answered.\par

## 2022-06-22 NOTE — HISTORY OF PRESENT ILLNESS
[de-identified] : Drea has completed all of her preoperative evaluations and is scheduled for the laparoscopic sleeve gastrectomy on June 27, 2022.

## 2022-06-22 NOTE — ASSESSMENT
[FreeTextEntry1] : 20 y/o female with Class 3 obesity scheduled for laparoscopic sleeve gastrectomy.

## 2022-06-24 NOTE — ASU PATIENT PROFILE, ADULT - NS PRO INFO GIVEN TO
patient
PAST MEDICAL HISTORY:  Cholelithiasis     Coronary artery graft present     HLD (hyperlipidemia)     HTN (hypertension)     PVD (peripheral vascular disease)     SDH (subdural hematoma)

## 2022-06-24 NOTE — ASU PATIENT PROFILE, ADULT - FALL HARM RISK - UNIVERSAL INTERVENTIONS
Bed in lowest position, wheels locked, appropriate side rails in place/Call bell, personal items and telephone in reach/Instruct patient to call for assistance before getting out of bed or chair/Non-slip footwear when patient is out of bed/[place] to call system/Physically safe environment - no spills, clutter or unnecessary equipment/Purposeful Proactive Rounding/Room/bathroom lighting operational, light cord in reach

## 2022-06-26 RX ORDER — ACETAMINOPHEN 500 MG
1000 TABLET ORAL ONCE
Refills: 0 | Status: DISCONTINUED | OUTPATIENT
Start: 2022-06-27 | End: 2022-06-28

## 2022-06-26 RX ORDER — BUPIVACAINE 13.3 MG/ML
20 INJECTION, SUSPENSION, LIPOSOMAL INFILTRATION ONCE
Refills: 0 | Status: DISCONTINUED | OUTPATIENT
Start: 2022-06-27 | End: 2022-06-28

## 2022-06-27 ENCOUNTER — INPATIENT (INPATIENT)
Facility: HOSPITAL | Age: 22
LOS: 0 days | Discharge: HOME | End: 2022-06-28
Attending: SURGERY | Admitting: SURGERY
Payer: MEDICAID

## 2022-06-27 ENCOUNTER — RESULT REVIEW (OUTPATIENT)
Age: 22
End: 2022-06-27

## 2022-06-27 ENCOUNTER — TRANSCRIPTION ENCOUNTER (OUTPATIENT)
Age: 22
End: 2022-06-27

## 2022-06-27 VITALS
HEART RATE: 77 BPM | DIASTOLIC BLOOD PRESSURE: 57 MMHG | SYSTOLIC BLOOD PRESSURE: 106 MMHG | HEIGHT: 65 IN | RESPIRATION RATE: 77 BRPM | TEMPERATURE: 98 F | WEIGHT: 293 LBS | OXYGEN SATURATION: 99 %

## 2022-06-27 DIAGNOSIS — Z90.89 ACQUIRED ABSENCE OF OTHER ORGANS: Chronic | ICD-10-CM

## 2022-06-27 LAB
ABO RH CONFIRMATION: SIGNIFICANT CHANGE UP
GLUCOSE BLDC GLUCOMTR-MCNC: 85 MG/DL — SIGNIFICANT CHANGE UP (ref 70–99)
HCT VFR BLD CALC: 36.5 % — LOW (ref 37–47)
HGB BLD-MCNC: 11.8 G/DL — LOW (ref 12–16)
MCHC RBC-ENTMCNC: 26.4 PG — LOW (ref 27–31)
MCHC RBC-ENTMCNC: 32.3 G/DL — SIGNIFICANT CHANGE UP (ref 32–37)
MCV RBC AUTO: 81.7 FL — SIGNIFICANT CHANGE UP (ref 81–99)
NRBC # BLD: 0 /100 WBCS — SIGNIFICANT CHANGE UP (ref 0–0)
PLATELET # BLD AUTO: 211 K/UL — SIGNIFICANT CHANGE UP (ref 130–400)
RBC # BLD: 4.47 M/UL — SIGNIFICANT CHANGE UP (ref 4.2–5.4)
RBC # FLD: 16 % — HIGH (ref 11.5–14.5)
WBC # BLD: 15.19 K/UL — HIGH (ref 4.8–10.8)
WBC # FLD AUTO: 15.19 K/UL — HIGH (ref 4.8–10.8)

## 2022-06-27 PROCEDURE — 93010 ELECTROCARDIOGRAM REPORT: CPT

## 2022-06-27 PROCEDURE — 88305 TISSUE EXAM BY PATHOLOGIST: CPT | Mod: 26

## 2022-06-27 PROCEDURE — 43775 LAP SLEEVE GASTRECTOMY: CPT

## 2022-06-27 PROCEDURE — 99291 CRITICAL CARE FIRST HOUR: CPT | Mod: 24,25

## 2022-06-27 RX ORDER — FAMOTIDINE 10 MG/ML
20 INJECTION INTRAVENOUS ONCE
Refills: 0 | Status: COMPLETED | OUTPATIENT
Start: 2022-06-27 | End: 2022-06-27

## 2022-06-27 RX ORDER — HYDROMORPHONE HYDROCHLORIDE 2 MG/ML
0.5 INJECTION INTRAMUSCULAR; INTRAVENOUS; SUBCUTANEOUS
Refills: 0 | Status: DISCONTINUED | OUTPATIENT
Start: 2022-06-27 | End: 2022-06-27

## 2022-06-27 RX ORDER — ALBUTEROL 90 UG/1
2 AEROSOL, METERED ORAL
Qty: 0 | Refills: 0 | DISCHARGE

## 2022-06-27 RX ORDER — KETOROLAC TROMETHAMINE 30 MG/ML
15 SYRINGE (ML) INJECTION EVERY 6 HOURS
Refills: 0 | Status: DISCONTINUED | OUTPATIENT
Start: 2022-06-27 | End: 2022-06-28

## 2022-06-27 RX ORDER — SODIUM CHLORIDE 9 MG/ML
1000 INJECTION, SOLUTION INTRAVENOUS
Refills: 0 | Status: DISCONTINUED | OUTPATIENT
Start: 2022-06-27 | End: 2022-06-28

## 2022-06-27 RX ORDER — KETOROLAC TROMETHAMINE 30 MG/ML
15 SYRINGE (ML) INJECTION ONCE
Refills: 0 | Status: DISCONTINUED | OUTPATIENT
Start: 2022-06-27 | End: 2022-06-27

## 2022-06-27 RX ORDER — PROCHLORPERAZINE MALEATE 5 MG
10 TABLET ORAL ONCE
Refills: 0 | Status: COMPLETED | OUTPATIENT
Start: 2022-06-27 | End: 2022-06-27

## 2022-06-27 RX ORDER — SODIUM CHLORIDE 9 MG/ML
1000 INJECTION, SOLUTION INTRAVENOUS
Refills: 0 | Status: DISCONTINUED | OUTPATIENT
Start: 2022-06-27 | End: 2022-06-27

## 2022-06-27 RX ORDER — ONDANSETRON 8 MG/1
4 TABLET, FILM COATED ORAL ONCE
Refills: 0 | Status: COMPLETED | OUTPATIENT
Start: 2022-06-27 | End: 2022-06-27

## 2022-06-27 RX ORDER — PANTOPRAZOLE SODIUM 20 MG/1
40 TABLET, DELAYED RELEASE ORAL DAILY
Refills: 0 | Status: DISCONTINUED | OUTPATIENT
Start: 2022-06-27 | End: 2022-06-28

## 2022-06-27 RX ORDER — ENOXAPARIN SODIUM 100 MG/ML
40 INJECTION SUBCUTANEOUS EVERY 12 HOURS
Refills: 0 | Status: DISCONTINUED | OUTPATIENT
Start: 2022-06-27 | End: 2022-06-28

## 2022-06-27 RX ORDER — MORPHINE SULFATE 50 MG/1
2 CAPSULE, EXTENDED RELEASE ORAL
Refills: 0 | Status: DISCONTINUED | OUTPATIENT
Start: 2022-06-27 | End: 2022-06-27

## 2022-06-27 RX ORDER — SCOPALAMINE 1 MG/3D
1 PATCH, EXTENDED RELEASE TRANSDERMAL ONCE
Refills: 0 | Status: COMPLETED | OUTPATIENT
Start: 2022-06-27 | End: 2022-06-27

## 2022-06-27 RX ORDER — GABAPENTIN 400 MG/1
100 CAPSULE ORAL THREE TIMES A DAY
Refills: 0 | Status: DISCONTINUED | OUTPATIENT
Start: 2022-06-27 | End: 2022-06-28

## 2022-06-27 RX ORDER — ACETAMINOPHEN 500 MG
1000 TABLET ORAL ONCE
Refills: 0 | Status: DISCONTINUED | OUTPATIENT
Start: 2022-06-27 | End: 2022-06-28

## 2022-06-27 RX ORDER — OXYCODONE AND ACETAMINOPHEN 5; 325 MG/1; MG/1
1 TABLET ORAL ONCE
Refills: 0 | Status: DISCONTINUED | OUTPATIENT
Start: 2022-06-27 | End: 2022-06-27

## 2022-06-27 RX ORDER — ENOXAPARIN SODIUM 100 MG/ML
40 INJECTION SUBCUTANEOUS ONCE
Refills: 0 | Status: COMPLETED | OUTPATIENT
Start: 2022-06-27 | End: 2022-06-27

## 2022-06-27 RX ORDER — ALBUTEROL 90 UG/1
2 AEROSOL, METERED ORAL EVERY 6 HOURS
Refills: 0 | Status: DISCONTINUED | OUTPATIENT
Start: 2022-06-27 | End: 2022-06-28

## 2022-06-27 RX ORDER — ONDANSETRON 8 MG/1
4 TABLET, FILM COATED ORAL EVERY 6 HOURS
Refills: 0 | Status: DISCONTINUED | OUTPATIENT
Start: 2022-06-27 | End: 2022-06-28

## 2022-06-27 RX ADMIN — HYDROMORPHONE HYDROCHLORIDE 0.5 MILLIGRAM(S): 2 INJECTION INTRAMUSCULAR; INTRAVENOUS; SUBCUTANEOUS at 09:40

## 2022-06-27 RX ADMIN — HYDROMORPHONE HYDROCHLORIDE 0.5 MILLIGRAM(S): 2 INJECTION INTRAMUSCULAR; INTRAVENOUS; SUBCUTANEOUS at 10:34

## 2022-06-27 RX ADMIN — Medication 15 MILLIGRAM(S): at 11:58

## 2022-06-27 RX ADMIN — ENOXAPARIN SODIUM 40 MILLIGRAM(S): 100 INJECTION SUBCUTANEOUS at 07:11

## 2022-06-27 RX ADMIN — ENOXAPARIN SODIUM 40 MILLIGRAM(S): 100 INJECTION SUBCUTANEOUS at 18:28

## 2022-06-27 RX ADMIN — FAMOTIDINE 20 MILLIGRAM(S): 10 INJECTION INTRAVENOUS at 07:21

## 2022-06-27 RX ADMIN — HYDROMORPHONE HYDROCHLORIDE 0.5 MILLIGRAM(S): 2 INJECTION INTRAMUSCULAR; INTRAVENOUS; SUBCUTANEOUS at 09:45

## 2022-06-27 RX ADMIN — Medication 15 MILLIGRAM(S): at 11:26

## 2022-06-27 RX ADMIN — SCOPALAMINE 1 PATCH: 1 PATCH, EXTENDED RELEASE TRANSDERMAL at 19:25

## 2022-06-27 RX ADMIN — ONDANSETRON 4 MILLIGRAM(S): 8 TABLET, FILM COATED ORAL at 20:06

## 2022-06-27 RX ADMIN — SODIUM CHLORIDE 125 MILLILITER(S): 9 INJECTION, SOLUTION INTRAVENOUS at 21:41

## 2022-06-27 RX ADMIN — ONDANSETRON 4 MILLIGRAM(S): 8 TABLET, FILM COATED ORAL at 09:42

## 2022-06-27 RX ADMIN — SCOPALAMINE 1 PATCH: 1 PATCH, EXTENDED RELEASE TRANSDERMAL at 07:10

## 2022-06-27 RX ADMIN — Medication 10 MILLIGRAM(S): at 13:05

## 2022-06-27 RX ADMIN — HYDROMORPHONE HYDROCHLORIDE 0.5 MILLIGRAM(S): 2 INJECTION INTRAMUSCULAR; INTRAVENOUS; SUBCUTANEOUS at 10:20

## 2022-06-27 RX ADMIN — SODIUM CHLORIDE 125 MILLILITER(S): 9 INJECTION, SOLUTION INTRAVENOUS at 09:44

## 2022-06-27 RX ADMIN — PANTOPRAZOLE SODIUM 40 MILLIGRAM(S): 20 TABLET, DELAYED RELEASE ORAL at 11:25

## 2022-06-27 NOTE — CONSULT NOTE ADULT - CONSULT REASON
Your current Orthopaedic problem we are working together to treat is:  pain    It is recommended you schedule a follow-up appointment with ANSLEY Holder in 6 months.      Office hours are 8:00 am to 5:00 pm Monday through Friday. If it is urgent that you speak with someone outside of these hours, our [facility] will be able to assist you. You can reach the office by calling the [facility] - PAIN LINE [phone]    We do highly recommend Sami, if you do not already have this. You can request access via the internet or by simply talking with a  at any of the clinics.   [email or web address].      Thank you for choosing [facility] as your Pain Management provider!          
s/p lap sleeve gastrectomy, b/l TAP block, upper endoscopy with negative leak test

## 2022-06-27 NOTE — PATIENT PROFILE ADULT - FALL HARM RISK - HARM RISK INTERVENTIONS

## 2022-06-27 NOTE — CONSULT NOTE ADULT - ASSESSMENT
Assessment & Plan  21y female with PMHx of MATIAS (not on CPAP), asthma, morbid obesity with BMI 57, who presented electively today, s/p lap sleeve gastrectomy, b/l TAP block, upper endoscopy with negative leak test.     NEURO:   -Acute pain-controlled with tylenol IV, toradol, and gabapentin.  -dilaudid if needed    RESP:   #asthma  -continue Albuterol inhaler prn  -  Oxygen insufficiency-wean off NC to RA as tolerate  -Incentive spirometer  -f/up CXR  #MATIAS (not on CPAP at home)  -monitor O2 sat     CARDS:    -no chronic dz  -keep normotensive  -ECG: NSR @ 77 bpm, QTc 479    GI/NUTR:     Diet: fredy phase I as per fredy protocol     GI Prophylaxis- PPI    Bowel regimen- none    /RENAL:        Monitor UO- cotton in place  -IVF: LR @ 125/hr    Labs:          BUN/Cr-pending tonight          Electrolytes-pending tonight    HEME/ONC:       DVT prophylaxis-enoxaparin Injectable 40 q12h (BMI 57), SCDs    Labs: Hb/Hct:  pending tonight                      Plts:                  PTT/INR:          ID:  WBC-pending tonight  Temp trend- 24hrs T(F): 97.9 (06-27 @ 11:30), Max: 98.4 (06-27 @ 07:43)  Antibiotics-Cefotetan cecy-op      ENDO:   -no chronic dz   -FS q6h while NPO    HA1C in am    LINES/DRAINS:  PIV    DISPO:    SDU  approved by Dr. Sanchez Assessment & Plan  21y female with PMHx of MATIAS (not on CPAP), asthma, morbid obesity with BMI 57, who presented electively today, s/p lap sleeve gastrectomy, b/l TAP block, upper endoscopy with negative leak test.     NEURO:   -Acute pain-controlled with tylenol IV, toradol, and gabapentin.  -dilaudid if needed    RESP:   #asthma  -continue Albuterol inhaler prn  -  Oxygen insufficiency-wean off NC to RA as tolerate  -Incentive spirometer  -f/up CXR  #MATIAS (not on CPAP at home)  -monitor O2 sat     CARDS:    -no chronic dz  -keep normotensive  -ECG: NSR @ 77 bpm, QTc 479    GI/NUTR:     Diet: fredy phase I as per fredy protocol     GI Prophylaxis- PPI    Bowel regimen- none    /RENAL:        Monitor UO- passed TOV  -IVF: LR @ 125/hr    Labs:          BUN/Cr-pending tonight          Electrolytes-pending tonight    HEME/ONC:       DVT prophylaxis-enoxaparin Injectable 40 q12h (BMI 57), SCDs    Labs: Hb/Hct:  pending tonight                      Plts:                  PTT/INR:          ID:  WBC-pending tonight  Temp trend- 24hrs T(F): 97.9 (06-27 @ 11:30), Max: 98.4 (06-27 @ 07:43)  Antibiotics-Cefotetan cecy-op      ENDO:   -no chronic dz   -FS q6h while NPO    HA1C in am    LINES/DRAINS:  PIV    DISPO:    SDU  approved by Dr. Sanchez

## 2022-06-27 NOTE — CONSULT NOTE ADULT - ATTENDING COMMENTS
Critical Care: [id number]   This patient has a high probability of sudden, clinically significant deterioration, which requires the highest level of physician preparedness to intervene urgently. I managed/supervised life or organ supporting interventions that required frequent physician assessment. I devoted my full attention in the ICU to the direct care of this patient for the period of time indicated below. Time I spent with the family or surrogate(s) is included only if the patient was incapable of providing the necessary information or participating in medical decision making. Time devoted to teaching and to any procedures I billed separately is not included.     ELISE CUEVAS 21y Female admitted to [ ] SICU /[ ] SDU with morbid obesity S/P gastric restrictive procedure, MATIAS. Patient presented with hemodynamically labile and with high risk for airway obstruction, electrolytes abnormality   Patient is examined and evaluated at the bedside with SICU team. Treatment plan discussed with SICU team, nurses and primary team.   Chest X-ray and all relevant studies reviewed during rounds.  Will continue hemodynamic monitoring as per protocol in SICU.    Neuro:  GCS [15 ]   [x ]  Neurovascular checks as per SICU protocol                 [ ] 3% NaCl                  Paralysis [ ] Yes  [x ]  No      Sedated/Pain control with                 [ ] Dilaudid drip, [ ]  Ketamine, [ ] Fentanyl, [ ] Propofol, [ ] Precedex, [ ] Versed, [ ] Ativan,                           [ ] OxyContin standing,  [ ] OxyContin PRN, [x ] Dilaudid PRN pushes, [ ] Fentanyl PRN pushes, [ ] PCA,                [x ] Tylenol IV/PO, [x ] Gabapentin, [x ]  Ketorolac, [ ] Tramadol,  [ ] Lidoderm Patch       Other Medications               [ ] Seroquel, [ ] Zyprexa, [ ] Haldol, [ ] Zyprexa,  [ ] Clonazepam [ ] Xanax, [ ] Versed/Ativan PRN, [ ] Valium [ ] None               [ ] Robaxin   [ ] Baclofen  [ ] Flexeril               [ ] Keppra  [ ] Lamictal  [ ] Depakote  [ ] Dilantin               [ ] CIWA (Ativan/Valium/ Librium)  CV: continue to monitor  On pressors [ ]  Yes  [x ]  No          [ ]  Levophed, [ ] Alonzo-Synephrine, [ ] Vasopressin, [ ]  Epinephrine          [ ] Dobutamine, [ ] Milrinone, [ ]  Midodrine,  [ ] Others    Other Cardiac Meds          [ ] Amiodarone IV/PO, [ ] Digoxin, [ ] Cardizem drip, [ ] Cleviprex drip, [ ] Esmolol drip  Respiratory: Acute respiratory insufficiency -> continue monitoring                        None Invasive Support  [ x] Incentive Spirometer                                  [x ] BiPAP   [ ] CPAP/NIV   [ ] HFNC   [ ] NR Face Mask   [ x] NC  [ ] Trach Caller                        Ventilatory support  [ ] Yes [x ] No   [ ] SBT                                  [ ] PC    [ ] VC   [ ] AC   [ ] BiVent/APRV   [ ]CPAP   GI  [x ]  bowel regiment on hold  [x ] BM none [ ] Flatus              [ ] Ostomy        Prophylaxis [x ] PPI  [ ] H2 Blockers  [ ] Others  Nutrition: continue   [x ] Diet as per bariatric protocol  [ ] TPN/PPN   [ ] calories count   [ ]  Tube Feeds     Renal: Continue I&Os monitoring, Mejia catheter  [x ] Yes,  [ ]   No ,  [ ] Consideration for discontinuation [ ] Taxes cath/PrimaFit  [ ] TOV       Lytes/Acid-base: replete hypokalemia, hypomagnesemia, hypocalcemia, hypophosphatemia        IV Fluids   [x ] LR@125ml/hr,  [ ] NS  [ ] D5W1/2NS [ ] Bicarbonate  [ ] Albumin   ID: leukocytosis -> continue to monitor:         IV Abx [x ] Yes cecy-OP, [ ] No;  ID consulted [ ] Yes, [x ] No        Cultures send  [ ] Respiratory   [ ] Blood   [ ] Urine  [ ] Fluids  [ ] Tissue  [x ]  None  Lines:   [ ] Right   [ ] Left  [ ] Bilateral                     [ ] Subclavian TLC        [ ]  Internal Jugular TLC     [ ]  Femoral TLC                     [ ] Subclavian Cordis    [ ] Internal Jugular Cordis   [ ] Femoral Cordis                     [ ] HD catheter    [ ] PICC     [ ]  Midline   [ x] Peripheral IVs                                                 [ ] Right   [ ] Left   [x ] None                     [ ] Radial A-Line    [ ] Femoral A-Line   [ ] Axillary A-Line               Heme: continue to evaluate for acute blood loss anemia- trend Hg/Hct                     AC Reversal Indicated [ ] Yes  [x ] No                    Transfused  indicated  [ ] Yes, [x ] No    [ ] PRBCs   [ ] Platelets   [ ] FFPs   [ ] Cryoprecipitate                    Should be started on or continued with  following  [ ] Yes,  [ x] No                               [ ] Lovenox  [ ] Coumadin  [ ] Heparin drip  [ ] NOVACs  [ ] ASA  [ ] Antiplatelets   Endocrine: Prevent and treat hyperglycemia with insulin as needed,                                 Insuline drip [ ] Yes, [ x] No   PV: follow pulse exam  Skin: decub precautions  DVT Prophylaxis:  [x ] SCDs  [ ] Heparin SQ  [x ] Lovenox  SQ  Stress Gastritis Prophylaxis: PPI/H2 Blockers if indicated  Mobility: patient is evaluated at the bedside with mobility team and the goals for today are discussed with PT [x ] out of bed to chair    PATIENT/FAMILY/SURROGATE CONFERENCE:  [ x] Yes with patient at the bedside. [ ] No  PURPOSE: To obtain necessary information, To discuss treatment options under consideration today.    I saw and evaluated the patient personally. I have reviewed and agree with note above. Treatment plan discussed with SICU team, nurses and primary team at the time of the multidisciplinary rounds. The above note is NOT written at the time of rounds and will reflect all changes throughout management of the patient for the day note is written for.    Urszula Sanchez MD, FACS  Trauma/ACS/SCC Attending

## 2022-06-27 NOTE — BRIEF OPERATIVE NOTE - NSICDXBRIEFPROCEDURE_GEN_ALL_CORE_FT
PROCEDURES:  Laparoscopic sleeve gastrectomy 27-Jun-2022 09:31:36  Milena Hardin  Upper endoscopy 27-Jun-2022 09:31:44  Milena Hardin, transversus abdominis plane, bilateral 27-Jun-2022 09:32:03  Milena Hardin

## 2022-06-27 NOTE — PACU DISCHARGE NOTE - COMMENTS
Pt tolerated procedure  Perioperative course uneventful  No obvious anesthesia related issues  sign out to pacu and sicu

## 2022-06-27 NOTE — CONSULT NOTE ADULT - SUBJECTIVE AND OBJECTIVE BOX
SICU Consultation Note  =====================================================  HPI:   21y female with PMHx of MATIAS (not on CPAP), asthma, morbid obesity with BMI 57, with unsuccessful attempts at weight loss, who presented electively today, s/p lap sleeve gastrectomy, b/l TAP block, upper endoscopy with negative leak test.  Intra-op no hemodynamic instabilty, post-op successfully extubated.  SICU/SDU call for close respiratory monitoring in the setting of morbid obesity and MATIAS.    Surgery Information  OR time: 1hr 15mins     EBL: 10       UO: no cotton        IV Fluids: 800cc     Blood Products: none       PAST MEDICAL & SURGICAL HISTORY:  Asthma  last attack 2/22    Obesity, morbid    MATIAS (obstructive sleep apnea) - not on CPAP    H-pylori infection - treated     S/P tonsillectomy and adenoidectomy  2011      Allergies  No Known Allergies    Home Medications:  Albuterol (Eqv-ProAir HFA) 90 mcg/inh inhalation aerosol: 2 puff(s) inhaled every 6 hours, As Needed (27 Jun 2022 06:58)    Advanced Directives: Full Code     ROS:  [x ] A ten-point review of systems was otherwise negative except as noted.  [ ] Due to altered mental status/intubation, subjective information were not able to be obtained from the patient. History was obtained, to the extent possible, from review of the chart and collateral sources of information.      CURRENT MEDICATIONS:   --------------------------------------------------------------------------------------  Neurologic Medications  acetaminophen   IVPB .. 1000 milliGRAM(s) IV Intermittent once PRN Moderate Pain (4 - 6)  acetaminophen   IVPB .. 1000 milliGRAM(s) IV Intermittent once  gabapentin Solution 100 milliGRAM(s) Oral three times a day PRN pain  morphine  - Injectable 2 milliGRAM(s) IV Push every 10 minutes PRN Mild Pain (1 - 3)  ondansetron Injectable 4 milliGRAM(s) IV Push every 6 hours PRN Nausea  oxycodone    5 mG/acetaminophen 325 mG 1 Tablet(s) Oral once PRN Moderate Pain (4 - 6)    Respiratory Medications  ALBUTerol    90 MICROgram(s) HFA Inhaler 2 Puff(s) Inhalation every 6 hours PRN Shortness of Breath and/or Wheezing    Cardiovascular Medications    Gastrointestinal Medications  lactated ringers. 1000 milliLiter(s) IV Continuous <Continuous>  lactated ringers. 1000 milliLiter(s) IV Continuous <Continuous>  pantoprazole  Injectable 40 milliGRAM(s) IV Push daily    Genitourinary Medications    Hematologic/Oncologic Medications  enoxaparin Injectable 40 milliGRAM(s) SubCutaneous every 12 hours    Antimicrobial/Immunologic Medications    Endocrine/Metabolic Medications    Topical/Other Medications  BUpivacaine liposome 1.3% Injectable 20 milliLiter(s) Local Injection once    --------------------------------------------------------------------------------------    Vital Signs Last 24 Hrs  T(C): 36.6 (27 Jun 2022 11:30), Max: 36.9 (27 Jun 2022 07:43)  T(F): 97.9 (27 Jun 2022 11:30), Max: 98.4 (27 Jun 2022 07:43)  HR: 66 (27 Jun 2022 11:30) (18 - 89)  BP: 130/90 (27 Jun 2022 11:30) (106/57 - 155/68)  BP(mean): --  RR: 17 (27 Jun 2022 11:30) (10 - 77)  SpO2: 94% (27 Jun 2022 11:30) (94% - 100%)        LABS:  Post-op tonight @ 2000          EXAM:  General/Neuro  GCS: 15  Exam: lethargic but easily arousable, alert, oriented x 3, no focal deficits. PERRLA, EOMI, MILES    Respiratory  Exam: Lungs clear to auscultation, Normal expansion/effort.     Cardiovascular  Exam: S1, S2.  Regular rate and rhythm.   Cardiac Rhythm: Normal Sinus Rhythm    GI  Exam: Abdomen soft, mildly tender, Non-distended.   Wound: 5 lap band sites oprn to air, c/d/i    Extremities  Exam: Extremities warm, well-perfused.  No Peripheral edema b/l LE    Derm:  Exam: Good skin turgor, no skin breakdown.      :   Exam: no cotton catheter     Tubes/Lines/Drains  ***  [x] Peripheral IV

## 2022-06-27 NOTE — CHART NOTE - NSCHARTNOTEFT_GEN_A_CORE
Post Operative Note  Patient: ELISE CUEVAS 21y (2000) Female   MRN: [id number]  Location: [address]  Visit: 06-27-22 Inpatient  Date: 06-27-22 @ 17:32    Procedure: Class 3 severe obesity in adult     S/P Laparoscopic sleeve gastrectomy    Upper endoscopy    Block, transversus abdominis plane, bilateral        Subjective:   Nausea: yes, Vomiting: no, Ambulating: yes, Flatus:  no  Pain Assessment: Patient is complaining of no pain that is appropriate for post-operative course.     Objective:  Vitals: T(F): 96.2 (06-27-22 @ 15:56), Max: 98.4 (06-27-22 @ 07:43)  HR: 65 (06-27-22 @ 16:00)  BP: 101/59 (06-27-22 @ 16:00) (101/59 - 155/68)  RR: 17 (06-27-22 @ 12:00)  SpO2: 96% (06-27-22 @ 16:00)  Vent Settings:     In:   06-27-22 @ 07:01  -  06-27-22 @ 17:32  --------------------------------------------------------  IN: 615 mL      IV Fluids: lactated ringers. 1000 milliLiter(s) (125 mL/Hr) IV Continuous <Continuous>      Out:   06-27-22 @ 07:01  -  06-27-22 @ 14:32  --------------------------------------------------------  OUT: 0 mL      EBL:     Voided Urine:   06-27-22 @ 07:01  -  06-27-22 @ 17:32  --------------------------------------------------------  OUT: 0 mL      Mejia Catheter: yes no   Drains:   MARILEE:    ,   Chest Tube:      NG Tube:       Physical Examination:  General Appearance: NAD,   HEENT: EOMI, sclera non-icteric.  Heart: RRR  Lungs: CTABL  Abdomen:  Soft, nontender, appropriate for post-op course, nondistended. No rigidity, guarding, or rebound tenderness.   MSK/Extremities: Warm & well-perfused. Peripheral pulses intact.  Skin: Warm, dry. No jaundice.   Incisions/Wounds: clean, dry and intact, no signs of infection/active bleeding/drainage    Medications: [Standing]  acetaminophen   IVPB .. 1000 milliGRAM(s) IV Intermittent once  acetaminophen   IVPB .. 1000 milliGRAM(s) IV Intermittent once PRN  ALBUTerol    90 MICROgram(s) HFA Inhaler 2 Puff(s) Inhalation every 6 hours PRN  BUpivacaine liposome 1.3% Injectable 20 milliLiter(s) Local Injection once  enoxaparin Injectable 40 milliGRAM(s) SubCutaneous every 12 hours  gabapentin Solution 100 milliGRAM(s) Oral three times a day PRN  ketorolac   Injectable 15 milliGRAM(s) IV Push every 6 hours PRN  lactated ringers. 1000 milliLiter(s) IV Continuous <Continuous>  ondansetron Injectable 4 milliGRAM(s) IV Push every 6 hours PRN  pantoprazole  Injectable 40 milliGRAM(s) IV Push daily    Medications: [PRN]  acetaminophen   IVPB .. 1000 milliGRAM(s) IV Intermittent once PRN  acetaminophen   IVPB .. 1000 milliGRAM(s) IV Intermittent once  ALBUTerol    90 MICROgram(s) HFA Inhaler 2 Puff(s) Inhalation every 6 hours PRN  BUpivacaine liposome 1.3% Injectable 20 milliLiter(s) Local Injection once  enoxaparin Injectable 40 milliGRAM(s) SubCutaneous every 12 hours  gabapentin Solution 100 milliGRAM(s) Oral three times a day PRN  ketorolac   Injectable 15 milliGRAM(s) IV Push every 6 hours PRN  lactated ringers. 1000 milliLiter(s) IV Continuous <Continuous>  ondansetron Injectable 4 milliGRAM(s) IV Push every 6 hours PRN  pantoprazole  Injectable 40 milliGRAM(s) IV Push daily      DVT PROPHYLAXIS: enoxaparin Injectable 40 milliGRAM(s) SubCutaneous every 12 hours    GI PROPHYLAXIS: pantoprazole  Injectable 40 milliGRAM(s) IV Push daily    ANTICOAGULATION:   ANTIBIOTICS:        Labs:                  Imaging:  No post-op imaging studies    Assessment:  21yF s/p lap sleeve gastrectomy     Plan:  - Continue Tyler clears  - Monitor vitals  - Monitor post-op labs and replete as necessary  - Monitor for bowel function  - Continue Pain Medications if necessary  - DVT LVN BID and GI Prophylaxis  - Monitor wound for redress as needed.      Date/Time: 06-27-22 @ 17:32

## 2022-06-28 ENCOUNTER — TRANSCRIPTION ENCOUNTER (OUTPATIENT)
Age: 22
End: 2022-06-28

## 2022-06-28 VITALS
OXYGEN SATURATION: 96 % | SYSTOLIC BLOOD PRESSURE: 119 MMHG | TEMPERATURE: 99 F | DIASTOLIC BLOOD PRESSURE: 74 MMHG | RESPIRATION RATE: 18 BRPM | HEART RATE: 87 BPM

## 2022-06-28 LAB
A1C WITH ESTIMATED AVERAGE GLUCOSE RESULT: 5.3 % — SIGNIFICANT CHANGE UP (ref 4–5.6)
ANION GAP SERPL CALC-SCNC: 13 MMOL/L — SIGNIFICANT CHANGE UP (ref 7–14)
BUN SERPL-MCNC: 9 MG/DL — LOW (ref 10–20)
CALCIUM SERPL-MCNC: 8.7 MG/DL — SIGNIFICANT CHANGE UP (ref 8.5–10.1)
CHLORIDE SERPL-SCNC: 105 MMOL/L — SIGNIFICANT CHANGE UP (ref 98–110)
CO2 SERPL-SCNC: 22 MMOL/L — SIGNIFICANT CHANGE UP (ref 17–32)
CREAT SERPL-MCNC: 0.7 MG/DL — SIGNIFICANT CHANGE UP (ref 0.7–1.5)
EGFR: 125 ML/MIN/1.73M2 — SIGNIFICANT CHANGE UP
ESTIMATED AVERAGE GLUCOSE: 105 MG/DL — SIGNIFICANT CHANGE UP (ref 68–114)
GLUCOSE SERPL-MCNC: 98 MG/DL — SIGNIFICANT CHANGE UP (ref 70–99)
MAGNESIUM SERPL-MCNC: 1.8 MG/DL — SIGNIFICANT CHANGE UP (ref 1.8–2.4)
PHOSPHATE SERPL-MCNC: 3.8 MG/DL — SIGNIFICANT CHANGE UP (ref 2.1–4.9)
POTASSIUM SERPL-MCNC: 4.6 MMOL/L — SIGNIFICANT CHANGE UP (ref 3.5–5)
POTASSIUM SERPL-SCNC: 4.6 MMOL/L — SIGNIFICANT CHANGE UP (ref 3.5–5)
SODIUM SERPL-SCNC: 140 MMOL/L — SIGNIFICANT CHANGE UP (ref 135–146)
SURGICAL PATHOLOGY STUDY: SIGNIFICANT CHANGE UP

## 2022-06-28 PROCEDURE — 71045 X-RAY EXAM CHEST 1 VIEW: CPT | Mod: 26

## 2022-06-28 PROCEDURE — 93010 ELECTROCARDIOGRAM REPORT: CPT

## 2022-06-28 RX ORDER — MAGNESIUM SULFATE 500 MG/ML
1 VIAL (ML) INJECTION ONCE
Refills: 0 | Status: COMPLETED | OUTPATIENT
Start: 2022-06-28 | End: 2022-06-28

## 2022-06-28 RX ORDER — METOCLOPRAMIDE HCL 10 MG
10 TABLET ORAL ONCE
Refills: 0 | Status: COMPLETED | OUTPATIENT
Start: 2022-06-28 | End: 2022-06-28

## 2022-06-28 RX ADMIN — Medication 50 GRAM(S): at 02:19

## 2022-06-28 RX ADMIN — SODIUM CHLORIDE 125 MILLILITER(S): 9 INJECTION, SOLUTION INTRAVENOUS at 05:06

## 2022-06-28 RX ADMIN — Medication 10 MILLIGRAM(S): at 07:05

## 2022-06-28 RX ADMIN — ONDANSETRON 4 MILLIGRAM(S): 8 TABLET, FILM COATED ORAL at 12:06

## 2022-06-28 RX ADMIN — PANTOPRAZOLE SODIUM 40 MILLIGRAM(S): 20 TABLET, DELAYED RELEASE ORAL at 11:28

## 2022-06-28 RX ADMIN — SCOPALAMINE 1 PATCH: 1 PATCH, EXTENDED RELEASE TRANSDERMAL at 07:35

## 2022-06-28 RX ADMIN — ENOXAPARIN SODIUM 40 MILLIGRAM(S): 100 INJECTION SUBCUTANEOUS at 05:46

## 2022-06-28 NOTE — DISCHARGE NOTE PROVIDER - NSDCCPTREATMENT_GEN_ALL_CORE_FT
PRINCIPAL PROCEDURE  Procedure: Laparoscopic sleeve gastrectomy  Findings and Treatment:       SECONDARY PROCEDURE  Procedure: Block, transversus abdominis plane, bilateral  Findings and Treatment:     Procedure: Upper endoscopy  Findings and Treatment:

## 2022-06-28 NOTE — DISCHARGE NOTE PROVIDER - HOSPITAL COURSE
21 year old female with PMH of obesity presents for her scheduled laparoscopic sleeve gastrectomy. Intraoperatively, lap sleeve gastrectomy over 34 F bougie, upper endoscopy with neg leak test. Post operatively, patient is tolerating fredy CLD, voiding, ambulating, and pain is controlled. Patient is medically cleared for discharge.

## 2022-06-28 NOTE — PROGRESS NOTE ADULT - SUBJECTIVE AND OBJECTIVE BOX
ELISE CUEVAS  [id number]  21y Female    Indication for ICU admission: s/p lap sleeve gastrectomy, b/l TAP block, upper endoscopy with negative leak test    Admit Date:06-27-22  ICU Date: 6/27/22  OR Date: 6/27/22    No Known Allergies    PAST MEDICAL & SURGICAL HISTORY:  Asthma  last attack 2/22    Obesity, morbid    MATIAS (obstructive sleep apnea) - not on CPAP    H-Pylori infection treated    S/P tonsillectomy and adenoidectomy  2011      Home Medications:  Albuterol (Eqv-ProAir HFA) 90 mcg/inh inhalation aerosol: 2 puff(s) inhaled every 6 hours, As Needed (27 Jun 2022 06:58)        24HRS EVENT:  6/27  OV   no acute events    Day:  -compazine for nausea   -nausea improved   -tolerating 1 cup so far  -voided        DVT PTX: enoxaparin Injectable 40 milliGRAM(s) SubCutaneous every 12 hours      GI PTX:pantoprazole  Injectable 40 milliGRAM(s) IV Push daily      ***Tubes/Lines/Drains  ***  Peripheral IV      REVIEW OF SYSTEMS    [ x] A ten-point review of systems was otherwise negative except as noted.  [ ] Due to altered mental status/intubation, subjective information were not able to be obtained from the patient. History was obtained, to the extent possible, from review of the chart and collateral sources of information.

## 2022-06-28 NOTE — PROGRESS NOTE ADULT - SUBJECTIVE AND OBJECTIVE BOX
BARIATRIC SURGERY PROGRESS NOTE    Patient: ELISE CUEVAS , 21y ([birth year])Female   MRN: [id number]  Location: [address]  Visit: 06-27-22 Inpatient  Date: 06-28-22 @ 08:15    Hospital Day #: 2  Post-Op Day #: 1    Procedure/Dx/Injuries: laparoscopic sleeve gastrectomy with bilateral TAP block and upper endoscopy     Events of past 24 hours: Pt was nauseated and vomited after drinking liquids. Ambulated without difficulty.  Pain controlled.     PAST MEDICAL & SURGICAL HISTORY:  Asthma  last attack 2/22      Obesity, morbid      MATIAS (obstructive sleep apnea)      S/P tonsillectomy and adenoidectomy  2011          Vitals:   T(F): 98.9 (06-28-22 @ 07:00), Max: 98.9 (06-28-22 @ 04:00)  HR: 80 (06-28-22 @ 07:00)  BP: 135/82 (06-28-22 @ 07:00)  RR: 18 (06-28-22 @ 07:00)  SpO2: 98% (06-28-22 @ 07:00)      Diet, Clear Liquid:   Bariatric Clear Liquid (BARICLLIQ)      Fluids: lactated ringers.: Solution, 1000 milliLiter(s) infuse at 125 mL/Hr      I & O's:    06-27-22 @ 07:01  -  06-28-22 @ 07:00  --------------------------------------------------------  IN:    IV PiggyBack: 100 mL    Lactated Ringers: 2000 mL    Oral Fluid: 240 mL  Total IN: 2340 mL    OUT:    Voided (mL): 580 mL  Total OUT: 580 mL    Total NET: 1760 mL        Bowel Movement: : [] YES [] NO  Flatus: : [] YES [] NO    PHYSICAL EXAM:  General: NAD, AAOx3, calm and cooperative  Cardiac: RRR   Respiratory: CTAB, normal respiratory effort, breath sounds equal BL, no wheeze, rhonchi or crackles  Abdomen: Soft, non-distended, non-tender, no rebound, no guarding. +BS.  Extremities: No calf tenderness or swelling  Skin: Warm/dry, normal color, no jaundice  Incision/wound: healing well, dressings in place, clean, dry and intact    MEDICATIONS  (STANDING):  acetaminophen   IVPB .. 1000 milliGRAM(s) IV Intermittent once  BUpivacaine liposome 1.3% Injectable 20 milliLiter(s) Local Injection once  enoxaparin Injectable 40 milliGRAM(s) SubCutaneous every 12 hours  lactated ringers. 1000 milliLiter(s) (125 mL/Hr) IV Continuous <Continuous>  pantoprazole  Injectable 40 milliGRAM(s) IV Push daily    MEDICATIONS  (PRN):  acetaminophen   IVPB .. 1000 milliGRAM(s) IV Intermittent once PRN Moderate Pain (4 - 6)  ALBUTerol    90 MICROgram(s) HFA Inhaler 2 Puff(s) Inhalation every 6 hours PRN Shortness of Breath and/or Wheezing  gabapentin Solution 100 milliGRAM(s) Oral three times a day PRN pain  ketorolac   Injectable 15 milliGRAM(s) IV Push every 6 hours PRN Moderate Pain (4 - 6)  ondansetron Injectable 4 milliGRAM(s) IV Push every 6 hours PRN Nausea      DVT PROPHYLAXIS: enoxaparin Injectable 40 milliGRAM(s) SubCutaneous every 12 hours    GI PROPHYLAXIS: pantoprazole  Injectable 40 milliGRAM(s) IV Push daily    ANTICOAGULATION:   ANTIBIOTICS:            LAB/STUDIES:  Labs:  CAPILLARY BLOOD GLUCOSE      POCT Blood Glucose.: 85 mg/dL (27 Jun 2022 21:29)                          11.8   15.19 )-----------( 211      ( 27 Jun 2022 20:54 )             36.5         06-27    140  |  105  |  9<L>  ----------------------------<  98  4.6   |  22  |  0.7      Calcium, Total Serum: 8.7 mg/dL (06-27-22 @ 20:54)          ASSESSMENT:  21y F w/ PMHx of  class 3 obesity POD #1 s/p sleeve gastrectomy     PLAN:  - Reglan 10mg IV X 1 for nausea  - Continue bariatric CLD  - DVT/GI ppx  - Encourage ambulation  - d/c home once nausea controlled and pt tolerating po liquids; pt has discharge instructions established prior at preop office appt  - f/u in 1 wk, pt already has appt scheduled

## 2022-06-28 NOTE — DISCHARGE NOTE PROVIDER - NSDCCPCAREPLAN_GEN_ALL_CORE_FT
PRINCIPAL DISCHARGE DIAGNOSIS  Diagnosis: Severe obesity (BMI >= 40)  Assessment and Plan of Treatment: Activity: No heavy lifting > 10 lbs for 2 weeks. Avoid straining or excessive activity x 6 weeks.   Dressings: Do not scrub wounds. You may shower but do not bathe. May use ice packs for pain and swelling.   Pain control: You may take over-the-counter tylenol and motrin three times per day with food for up to 3 days.   Medicine: Please take lovenox as prescribed.   Follow up: Please call the number provided to make an appointment with Dr. Lr in 1-2 weeks. Please call with any questions or concerns including fevers, worsening pain, pus from the wounds, or redness of the skin.

## 2022-06-28 NOTE — DISCHARGE NOTE PROVIDER - NSDCQMPCI_CARD_ALL_CORE
Patient has no complaints    -cfDNA neg, GIRL. -carrier neg   -AFP declines      1. H/o spina bifida in previous pregnancy  -reports pregnancy termination at 15 wk in [place] due to spina bifida  -NT normal   -Taking folic acid 4 mg  -L2 US with MFM with 2 vessel cord, no other abnormalities    2. 2 vessel cord  -growth US monthly starting 28wk  -NST reactive  -note, at time that above recs made by MFM, had also presumed pt's first child was SGA however corrected this in her hx today. Confirmed with Dr Cathlene Litten as follows: could reduce to growth US at 30 & 36 but I would still advise the NSTs at 34 weeks due to SUA (2 vessel cord). 3. COVID infection 2nd trimester  -growth uS 3rd tri as above      4.  GDA1  - well controlled with diet
No

## 2022-06-28 NOTE — ANESTHESIA FOLLOW-UP NOTE - NSINTERVIEW_GEN_ALL_CORE
Chief Complaint   Patient presents with   • Abnormal EKG       Subjective:   Noemi Lopez is a 69 y.o. female who presents today after right thalamic lacunar infarct was diagnosed in mid August 2019 with chief complaint of dizziness and abnormal CT.  During that hospitalization, sinus rhythm was documented with no evidence of arrhythmias.  EKG demonstrated diffuse ST elevation.  Troponin T levels were 21 and 22, considered unremarkable and not indicative of myocardial necrosis.  Echocardiogram was entirely normal although bubble study was not done.    The follow-up visit here apparently was arranged in August 2019 apparently to make sure she had no cardiac etiology such as PAF to explain the lacunar infarcts.    Since hospitalization she ambulates and uses a family member for support due to some sense of imbalance.  There was a time when she had severe vertigo but this is improved.  She also had diplopia which is improved.  She has no prior history of heart disease.  She denies palpitations.  She denies discomfort or shortness of breath at rest or with activity.  She is compliant with current meds.    Past Medical History:   Diagnosis Date   • Depression    • Diabetes (HCC)    • Hypertension      No past surgical history on file.  Family History   Problem Relation Age of Onset   • Diabetes Child    • Diabetes Other      Social History     Socioeconomic History   • Marital status:      Spouse name: Not on file   • Number of children: Not on file   • Years of education: Not on file   • Highest education level: Not on file   Occupational History   • Not on file   Social Needs   • Financial resource strain: Not on file   • Food insecurity:     Worry: Not on file     Inability: Not on file   • Transportation needs:     Medical: Not on file     Non-medical: Not on file   Tobacco Use   • Smoking status: Never Smoker   • Smokeless tobacco: Never Used   Substance and Sexual Activity   • Alcohol use: Never      Frequency: Never   • Drug use: Never   • Sexual activity: Not on file   Lifestyle   • Physical activity:     Days per week: Not on file     Minutes per session: Not on file   • Stress: Not on file   Relationships   • Social connections:     Talks on phone: Not on file     Gets together: Not on file     Attends Restorationist service: Not on file     Active member of club or organization: Not on file     Attends meetings of clubs or organizations: Not on file     Relationship status: Not on file   • Intimate partner violence:     Fear of current or ex partner: Not on file     Emotionally abused: Not on file     Physically abused: Not on file     Forced sexual activity: Not on file   Other Topics Concern   • Not on file   Social History Narrative    68 yo retired female     Allergies   Allergen Reactions   • Pcn [Penicillins]    • Thiamine      Outpatient Encounter Medications as of 10/9/2019   Medication Sig Dispense Refill   • SITagliptin (JANUVIA) 25 MG Tab Take 25 mg by mouth every day.     • aspirin 81 MG EC tablet Take 1 Tab by mouth every day. 30 Tab 0   • atorvastatin (LIPITOR) 80 MG tablet Take 1 Tab by mouth every evening. 30 Tab 0   • enalapril (VASOTEC) 5 MG Tab Take 1 Tab by mouth every day. 30 Tab 0   • metFORMIN (GLUCOPHAGE) 850 MG Tab Take 1 Tab by mouth 2 times a day, with meals. 60 Tab 0   • magnesium oxide (MAG-OX) 400 (241.3 Mg) MG Tab tablet Take 1 Tab by mouth every day. 30 Tab 0   • vitamin D (CHOLECALCIFEROL) 1000 UNIT Tab Take 2 Tabs by mouth every day. 30 Tab    • [DISCONTINUED] meclizine (ANTIVERT) 25 MG Tab Take 25 mg by mouth 3 times a day as needed.     • [DISCONTINUED] scopolamine (TRANSDERM-SCOP) 1 mg/72hr PATCH 72 HR Apply 1 Patch to skin as directed every 72 hours. (Patient not taking: Reported on 10/9/2019) 4 Patch 3     No facility-administered encounter medications on file as of 10/9/2019.      Review of Systems   Constitutional: Negative for chills, fever and malaise/fatigue.    Eyes: Positive for blurred vision. Negative for discharge.   Respiratory: Negative for cough and shortness of breath.    Cardiovascular: Negative for chest pain, palpitations, leg swelling and PND.   Gastrointestinal: Negative for heartburn and nausea.   Genitourinary: Negative for dysuria and urgency.   Musculoskeletal: Negative for myalgias.   Skin: Negative for itching and rash.   Neurological: Positive for dizziness. Negative for headaches.   Endo/Heme/Allergies: Negative for environmental allergies. Does not bruise/bleed easily.   Psychiatric/Behavioral: Negative for depression. The patient is not nervous/anxious.         Objective:   /74 (BP Location: Left arm, Patient Position: Sitting, BP Cuff Size: Adult)   Pulse 74   Ht 1.524 m (5')   Wt 67.6 kg (149 lb)   BMI 29.10 kg/m²     Physical Exam   Constitutional: She is oriented to person, place, and time.   Very pleasant lady appearing older than stated age.  History was taken with the assistance of an online .  Patient's son was also present.   Neck: No JVD present.   Cardiovascular: Normal rate and regular rhythm. Exam reveals no gallop and no friction rub.   No murmur heard.  Pulmonary/Chest: Effort normal and breath sounds normal.   Abdominal: Soft. There is no tenderness.   Neurological: She is alert and oriented to person, place, and time.   Skin: Skin is warm and dry.       Assessment:     1. Stroke, lacunar (HCC)         Medical Decision Making:  Today's Assessment / Status / Plan:   I reviewed the August 2019 hospital charts thoroughly.  No evidence of abnormal heart rhythm during that hospitalization.  Location of these infarcts are more likely due to thrombosis rather than thromboembolism.  No indication to monitor the heart for any length of time.  Appropriate therapy with aspirin antihypertensives and statins is being done and appears to be optimal..  No need for follow-up here.  Thank you for this consult..   Interviewed and evaluated

## 2022-06-28 NOTE — DISCHARGE NOTE NURSING/CASE MANAGEMENT/SOCIAL WORK - PATIENT PORTAL LINK FT
You can access the FollowMyHealth Patient Portal offered by [facility] by registering at the following website: [email or web address]. By joining [facility]’s FollowMyHealth portal, you will also be able to view your health information using other applications (apps) compatible with our system.

## 2022-06-28 NOTE — DISCHARGE NOTE PROVIDER - NSDCMRMEDTOKEN_GEN_ALL_CORE_FT
Albuterol (Eqv-ProAir HFA) 90 mcg/inh inhalation aerosol: 2 puff(s) inhaled every 6 hours, As Needed

## 2022-06-28 NOTE — PROGRESS NOTE ADULT - SUBJECTIVE AND OBJECTIVE BOX
Surgery Progress Note       Patient: ELISE CUEVAS , 21y ([birth year])Female   MRN: [id number]  Location: [address]  Visit: 06-27-22 Inpatient  Date: 06-28-22 @ 09:02        Admitted :06-27-22 (1d)  LOS: 1d    Procedure/Dx/Injuries: POD1 lap sleeve    Events of past 24 hours: Nausea overnight, +emesis this am. Pain controlled. walking, voiding      Vitals:   T(F): 98.9 (06-28-22 @ 07:00), Max: 98.9 (06-28-22 @ 04:00)  HR: 80 (06-28-22 @ 07:00)  BP: 135/82 (06-28-22 @ 07:00)  RR: 18 (06-28-22 @ 07:00)  SpO2: 98% (06-28-22 @ 07:00)      Diet, Clear Liquid:   Bariatric Clear Liquid (BARICLLIQ)      Fluids: lactated ringers.: Solution, 1000 milliLiter(s) infuse at 125 mL/Hr      I & O's:    06-27-22 @ 07:01  -  06-28-22 @ 07:00  --------------------------------------------------------  IN:    IV PiggyBack: 100 mL    Lactated Ringers: 2000 mL    Oral Fluid: 240 mL  Total IN: 2340 mL    OUT:    Voided (mL): 580 mL  Total OUT: 580 mL    Total NET: 1760 mL            PHYSICAL EXAM:  General Appearance: NAD  HEENT: Normocephalic  Heart:rrr  Lungs: No increased work of breathing or accessory muscle use.   Abdomen:  Soft, approp ttp  MSK/Extremities: Warm & well-perfused.   Skin: Warm, dry. No jaundice.   Incision/wound: healing well, clean, dry and intact    MEDICATIONS  (STANDING):  acetaminophen   IVPB .. 1000 milliGRAM(s) IV Intermittent once  BUpivacaine liposome 1.3% Injectable 20 milliLiter(s) Local Injection once  enoxaparin Injectable 40 milliGRAM(s) SubCutaneous every 12 hours  lactated ringers. 1000 milliLiter(s) (125 mL/Hr) IV Continuous <Continuous>  pantoprazole  Injectable 40 milliGRAM(s) IV Push daily    MEDICATIONS  (PRN):  acetaminophen   IVPB .. 1000 milliGRAM(s) IV Intermittent once PRN Moderate Pain (4 - 6)  ALBUTerol    90 MICROgram(s) HFA Inhaler 2 Puff(s) Inhalation every 6 hours PRN Shortness of Breath and/or Wheezing  gabapentin Solution 100 milliGRAM(s) Oral three times a day PRN pain  ketorolac   Injectable 15 milliGRAM(s) IV Push every 6 hours PRN Moderate Pain (4 - 6)  ondansetron Injectable 4 milliGRAM(s) IV Push every 6 hours PRN Nausea      DVT PROPHYLAXIS: enoxaparin Injectable 40 milliGRAM(s) SubCutaneous every 12 hours    GI PROPHYLAXIS: pantoprazole  Injectable 40 milliGRAM(s) IV Push daily    ANTICOAGULATION:   ANTIBIOTICS:            LAB/STUDIES:  Labs:  CAPILLARY BLOOD GLUCOSE      POCT Blood Glucose.: 85 mg/dL (27 Jun 2022 21:29)                          11.8   15.19 )-----------( 211      ( 27 Jun 2022 20:54 )             36.5         06-27    140  |  105  |  9<L>  ----------------------------<  98  4.6   |  22  |  0.7      Calcium, Total Serum: 8.7 mg/dL (06-27-22 @ 20:54)

## 2022-06-28 NOTE — DISCHARGE NOTE PROVIDER - CARE PROVIDER_API CALL
Josette Lr (MD)  Surgery  [address] [place] [address], [address] [place] [address]  Phone: [phone]  Fax: [phone]  Follow Up Time:

## 2022-06-28 NOTE — PROGRESS NOTE ADULT - ASSESSMENT
Assessment & Plan  21y female with PMHx of MATIAS (not on CPAP), asthma, morbid obesity with BMI 57, who presented electively today, s/p lap sleeve gastrectomy, b/l TAP block, upper endoscopy with negative leak test.     NEURO:   -Acute pain-controlled with tylenol IV, toradol, and gabapentin.  -dilaudid if needed    RESP:   #asthma  -continue Albuterol inhaler prn  -  on RA, sating well   -Incentive spirometer  -f/up CXR  #MATIAS (not on CPAP at home)  -monitor O2 sat     CARDS:    -no chronic dz  -keep normotensive  -ECG: NSR @ 77 bpm, QTc 479    GI/NUTR:     Diet: fredy phase I as per fredy protocol     GI Prophylaxis- PPI    Bowel regimen- none  --compazine for nausea   -nausea improved     /RENAL:        Monitor UO- voided   -IVF: LR @ 125/hr    Labs:          BUN/Cr-9/0.7          Electrolytes-Na 140 // K 4.6 // Phos 3.8 //  Mg 1.8 - hypomagnesemia repleted     HEME/ONC:       DVT prophylaxis-enoxaparin Injectable 40 q12h (BMI 57), SCDs    Labs: Hb/Hct:  11.8/36.5  -->           Plts:  211  -->              PTT/INR:          ID:  WBC-15  afebrile   Antibiotics-Cefotetan cecy-op      ENDO:   -no chronic dz   -FS q6h while NPO    HA1C in am    LINES/DRAINS:  PIV    DISPO:    SDU    
A/P:   21y F ADP4tme sleeve  -Continue fredy clears as tolerated  -PRN nausea medication, reglan ordered  -Ambulate, IS  -DVT ppx, protonix daily  -Will re-assess this afternoon for decreased nausea    Milena Hardin MD  MIS Fellow    [place] TEAM SPECTRA [id number]
Assessment & Plan  21y female with PMHx of MATIAS (not on CPAP), asthma, morbid obesity with BMI 57, who presented electively today, s/p lap sleeve gastrectomy, b/l TAP block, upper endoscopy with negative leak test.     NEURO:   -Acute pain-controlled with tylenol IV, toradol, and gabapentin.  -dilaudid if needed    RESP:   #asthma  -continue Albuterol inhaler prn  -  on RA, sating well   -Incentive spirometer  -f/up CXR  #MATIAS (not on CPAP at home)  -monitor O2 sat     CARDS:    -no chronic dz  -keep normotensive  -ECG: NSR @ 77 bpm, QTc 479    GI/NUTR:     Diet: fredy phase I as per fredy protocol     GI Prophylaxis- PPI    Bowel regimen- none  --compazine for nausea   -nausea improved     /RENAL:        Monitor UO- voided   -IVF: LR @ 125/hr    Labs:          BUN/Cr-9/0.7          Electrolytes-Na 140 // K 4.6 // Phos 3.8 //  Mg 1.8 - hypomagnesemia repleted     HEME/ONC:       DVT prophylaxis-enoxaparin Injectable 40 q12h (BMI 57), SCDs    Labs: Hb/Hct:  11.8/36.5  -->           Plts:  211  -->              PTT/INR:          ID:  WBC-15  afebrile   Antibiotics-Cefotetan cecy-op      ENDO:   -no chronic dz   -FS q6h while NPO    HA1C in am    LINES/DRAINS:  PIV    DISPO:    SDU

## 2022-06-28 NOTE — PROGRESS NOTE ADULT - SUBJECTIVE AND OBJECTIVE BOX
ELISE CUEVAS  [id number]  21y Female    Indication for ICU admission: s/p lap sleeve gastrectomy, b/l TAP block, upper endoscopy with negative leak test    Admit Date:06-27-22  ICU Date: 6/27/22  OR Date: 6/27/22    No Known Allergies    PAST MEDICAL & SURGICAL HISTORY:  Asthma  last attack 2/22    Obesity, morbid    MATIAS (obstructive sleep apnea) - not on CPAP    H-Pylori infection treated    S/P tonsillectomy and adenoidectomy  2011      Home Medications:  Albuterol (Eqv-ProAir HFA) 90 mcg/inh inhalation aerosol: 2 puff(s) inhaled every 6 hours, As Needed (27 Jun 2022 06:58)        24HRS EVENT:  6/27  OV   no acute events    Day:  -compazine for nausea   -nausea improved   -tolerating 1 cup so far  -voided        DVT PTX: enoxaparin Injectable 40 milliGRAM(s) SubCutaneous every 12 hours      GI PTX:pantoprazole  Injectable 40 milliGRAM(s) IV Push daily      ***Tubes/Lines/Drains  ***  Peripheral IV      REVIEW OF SYSTEMS    [ x] A ten-point review of systems was otherwise negative except as noted.  [ ] Due to altered mental status/intubation, subjective information were not able to be obtained from the patient. History was obtained, to the extent possible, from review of the chart and collateral sources of information.              Daily     Daily     Diet, Clear Liquid:   Bariatric Clear Liquid (BARICLLIQ) (06-27-22 @ 09:39)      CURRENT MEDS:  Neurologic Medications  acetaminophen   IVPB .. 1000 milliGRAM(s) IV Intermittent once PRN Moderate Pain (4 - 6)  acetaminophen   IVPB .. 1000 milliGRAM(s) IV Intermittent once  gabapentin Solution 100 milliGRAM(s) Oral three times a day PRN pain  ketorolac   Injectable 15 milliGRAM(s) IV Push every 6 hours PRN Moderate Pain (4 - 6)  ondansetron Injectable 4 milliGRAM(s) IV Push every 6 hours PRN Nausea    Respiratory Medications  ALBUTerol    90 MICROgram(s) HFA Inhaler 2 Puff(s) Inhalation every 6 hours PRN Shortness of Breath and/or Wheezing    Cardiovascular Medications    Gastrointestinal Medications  lactated ringers. 1000 milliLiter(s) IV Continuous <Continuous>  pantoprazole  Injectable 40 milliGRAM(s) IV Push daily    Genitourinary Medications    Hematologic/Oncologic Medications  enoxaparin Injectable 40 milliGRAM(s) SubCutaneous every 12 hours    Antimicrobial/Immunologic Medications    Endocrine/Metabolic Medications    Topical/Other Medications  BUpivacaine liposome 1.3% Injectable 20 milliLiter(s) Local Injection once      ICU Vital Signs Last 24 Hrs  T(C): 36.7 (28 Jun 2022 12:00), Max: 37.2 (28 Jun 2022 04:00)  T(F): 98.1 (28 Jun 2022 12:00), Max: 98.9 (28 Jun 2022 04:00)  HR: 71 (28 Jun 2022 12:00) (65 - 80)  BP: 141/91 (28 Jun 2022 12:00) (101/59 - 141/91)  BP(mean): 111 (28 Jun 2022 12:00) (73 - 111)  ABP: --  ABP(mean): --  RR: 18 (28 Jun 2022 07:00) (18 - 18)  SpO2: 96% (28 Jun 2022 12:00) (96% - 99%)            I&O's Summary    27 Jun 2022 07:01  -  28 Jun 2022 07:00  --------------------------------------------------------  IN: 2340 mL / OUT: 580 mL / NET: 1760 mL    28 Jun 2022 07:01  -  28 Jun 2022 14:56  --------------------------------------------------------  IN: 750 mL / OUT: 0 mL / NET: 750 mL      I&O's Detail    27 Jun 2022 07:01  -  28 Jun 2022 07:00  --------------------------------------------------------  IN:    IV PiggyBack: 100 mL    Lactated Ringers: 2000 mL    Oral Fluid: 240 mL  Total IN: 2340 mL    OUT:    Voided (mL): 580 mL  Total OUT: 580 mL    Total NET: 1760 mL      28 Jun 2022 07:01  -  28 Jun 2022 14:56  --------------------------------------------------------  IN:    Lactated Ringers: 750 mL  Total IN: 750 mL    OUT:  Total OUT: 0 mL    Total NET: 750 mL          PHYSICAL EXAM:    General/Neuro   GCS:   15  Deficits:                             alert & oriented x 3, no focal deficits  Pupils: PERRLA    Lungs:      clear to auscultation, Normal expansion/effort.     Cardiovascular : S1, S2.  Regular rate and rhythm. no  Peripheral edema   Cardiac Rhythm: Normal Sinus Rhythm    GI: Abdomen soft, Non-tender, Non-distended.      Wound: clean/dry/intact    Extremities: Extremities warm, pink, well-perfused.     Derm: Good skin turgor, no skin breakdown.      :       No Mejia catheter in place.      CXR:       LABS:  CAPILLARY BLOOD GLUCOSE      POCT Blood Glucose.: 85 mg/dL (27 Jun 2022 21:29)                          11.8   15.19 )-----------( 211      ( 27 Jun 2022 20:54 )             36.5       06-27    140  |  105  |  9<L>  ----------------------------<  98  4.6   |  22  |  0.7    Ca    8.7      27 Jun 2022 20:54  Phos  3.8     06-27  Mg     1.8     06-27

## 2022-06-29 ENCOUNTER — NON-APPOINTMENT (OUTPATIENT)
Age: 22
End: 2022-06-29

## 2022-06-30 ENCOUNTER — NON-APPOINTMENT (OUTPATIENT)
Age: 22
End: 2022-06-30

## 2022-07-01 DIAGNOSIS — J45.909 UNSPECIFIED ASTHMA, UNCOMPLICATED: ICD-10-CM

## 2022-07-01 DIAGNOSIS — E66.01 MORBID (SEVERE) OBESITY DUE TO EXCESS CALORIES: ICD-10-CM

## 2022-07-01 DIAGNOSIS — G47.33 OBSTRUCTIVE SLEEP APNEA (ADULT) (PEDIATRIC): ICD-10-CM

## 2022-07-01 DIAGNOSIS — E83.42 HYPOMAGNESEMIA: ICD-10-CM

## 2022-07-06 ENCOUNTER — APPOINTMENT (OUTPATIENT)
Dept: SURGERY | Facility: CLINIC | Age: 22
End: 2022-07-06

## 2022-07-06 VITALS
WEIGHT: 293 LBS | HEIGHT: 65 IN | SYSTOLIC BLOOD PRESSURE: 120 MMHG | TEMPERATURE: 96.6 F | OXYGEN SATURATION: 98 % | BODY MASS INDEX: 48.82 KG/M2 | DIASTOLIC BLOOD PRESSURE: 74 MMHG | HEART RATE: 94 BPM

## 2022-07-06 DIAGNOSIS — D64.9 ANEMIA, UNSPECIFIED: ICD-10-CM

## 2022-07-06 PROCEDURE — 99024 POSTOP FOLLOW-UP VISIT: CPT

## 2022-07-06 RX ORDER — GABAPENTIN 250 MG/5ML
250 SOLUTION ORAL EVERY 8 HOURS
Qty: 15 | Refills: 0 | Status: DISCONTINUED | COMMUNITY
Start: 2022-06-20 | End: 2022-07-06

## 2022-07-06 NOTE — HISTORY OF PRESENT ILLNESS
[Procedure: ___] : Procedure performed: [unfilled]  [Date of Surgery: ___] : Date of Surgery:   [unfilled] [Surgeon Name:   ___] : Surgeon Name: Dr. CHAVEZ [___ Days Post Op] : [unfilled] days  [de-identified] : Drea is doing well 9 days after her sleeve.  She is drinking 2 protein shakes daily.  She tried eggs and she tolerated them well.  No SOB or calf pain.  She is taking her vitamins and having regular bowel movements.

## 2022-07-06 NOTE — PHYSICAL EXAM
[de-identified] : anicteric sclera [de-identified] : breathing well [de-identified] : soft, NT, ND [de-identified] : healing well

## 2022-07-06 NOTE — ASSESSMENT
[___ Days Post Op] : [unfilled] days [de-identified] : Advance diet as tolerated while following the post-op diet instructions\par See our dietician today\par F/u with us 3 weeks.  Call sooner with questions or concerns.

## 2022-07-06 NOTE — DATA REVIEWED
[FreeTextEntry1] : Nataly Accession Number : [id number] Patient:   ELISE CUEVAS   Accession:                             [id number]  Collected Date/Time:                   6/27/2022 09:10 EDT Received Date/Time:                    6/27/2022 14:42 EDT  Surgical Pathology Report - Auth (Verified)  Specimen(s) Submitted Portion of stomach  Final Diagnosis Portion of stomach: --Hyperemic gastric mucosa without significant pathologic change.  Verified by: Larry Coleman M.D. (Electronic Signature) Reported on: 06/28/22 16:43 EDT, [place] [address] Phone: [phone]   Fax: [phone] _________________________________________________________________

## 2022-07-19 ENCOUNTER — NON-APPOINTMENT (OUTPATIENT)
Age: 22
End: 2022-07-19

## 2022-07-25 DIAGNOSIS — E66.01 MORBID (SEVERE) OBESITY DUE TO EXCESS CALORIES: ICD-10-CM

## 2022-07-26 RX ORDER — ENOXAPARIN SODIUM 40 MG/.4ML
40 INJECTION, SOLUTION SUBCUTANEOUS DAILY
Qty: 30 | Refills: 0 | Status: COMPLETED | COMMUNITY
Start: 2022-06-20 | End: 2022-07-26

## 2022-07-27 ENCOUNTER — APPOINTMENT (OUTPATIENT)
Dept: SURGERY | Facility: CLINIC | Age: 22
End: 2022-07-27

## 2022-07-27 VITALS
BODY MASS INDEX: 48.82 KG/M2 | HEART RATE: 69 BPM | DIASTOLIC BLOOD PRESSURE: 78 MMHG | WEIGHT: 293 LBS | SYSTOLIC BLOOD PRESSURE: 117 MMHG | TEMPERATURE: 97 F | HEIGHT: 65 IN | OXYGEN SATURATION: 98 %

## 2022-07-27 DIAGNOSIS — G47.33 OBSTRUCTIVE SLEEP APNEA (ADULT) (PEDIATRIC): ICD-10-CM

## 2022-07-27 DIAGNOSIS — Z98.84 BARIATRIC SURGERY STATUS: ICD-10-CM

## 2022-07-27 PROCEDURE — 99024 POSTOP FOLLOW-UP VISIT: CPT

## 2022-07-27 NOTE — REASON FOR VISIT
[Follow-Up Visit] : a follow-up visit for [S/P Bariatric Surgery] : s/p bariatric surgery [FreeTextEntry2] : sleeve gastrectomy 6/27/2022

## 2022-07-27 NOTE — PLAN
[FreeTextEntry1] : Start Ursodiol \par Can switch to Calcium minis as per dietician recommendations\par Blodo work before next visit\par F/u in 2 months, call sooner with questions or concerns.

## 2022-08-18 ENCOUNTER — EMERGENCY (EMERGENCY)
Facility: HOSPITAL | Age: 22
LOS: 0 days | Discharge: HOME | End: 2022-08-18
Attending: EMERGENCY MEDICINE | Admitting: EMERGENCY MEDICINE

## 2022-08-18 VITALS
TEMPERATURE: 98 F | HEIGHT: 65 IN | DIASTOLIC BLOOD PRESSURE: 76 MMHG | OXYGEN SATURATION: 100 % | WEIGHT: 250 LBS | HEART RATE: 92 BPM | SYSTOLIC BLOOD PRESSURE: 123 MMHG | RESPIRATION RATE: 18 BRPM

## 2022-08-18 DIAGNOSIS — M25.561 PAIN IN RIGHT KNEE: ICD-10-CM

## 2022-08-18 DIAGNOSIS — Z90.3 ACQUIRED ABSENCE OF STOMACH [PART OF]: Chronic | ICD-10-CM

## 2022-08-18 DIAGNOSIS — Z98.84 BARIATRIC SURGERY STATUS: ICD-10-CM

## 2022-08-18 DIAGNOSIS — M25.572 PAIN IN LEFT ANKLE AND JOINTS OF LEFT FOOT: ICD-10-CM

## 2022-08-18 DIAGNOSIS — J45.909 UNSPECIFIED ASTHMA, UNCOMPLICATED: ICD-10-CM

## 2022-08-18 DIAGNOSIS — Z90.89 ACQUIRED ABSENCE OF OTHER ORGANS: Chronic | ICD-10-CM
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
## 2022-08-18 PROCEDURE — 73562 X-RAY EXAM OF KNEE 3: CPT | Mod: 26,RT

## 2022-08-18 PROCEDURE — 73610 X-RAY EXAM OF ANKLE: CPT | Mod: 26,LT

## 2022-08-18 PROCEDURE — 29515 APPLICATION SHORT LEG SPLINT: CPT | Mod: LT

## 2022-08-18 PROCEDURE — 99284 EMERGENCY DEPT VISIT MOD MDM: CPT | Mod: 25

## 2022-08-18 RX ORDER — IBUPROFEN 200 MG
600 TABLET ORAL ONCE
Refills: 0 | Status: DISCONTINUED | OUTPATIENT
Start: 2022-08-18 | End: 2022-08-18

## 2022-08-18 RX ORDER — ACETAMINOPHEN 500 MG
650 TABLET ORAL ONCE
Refills: 0 | Status: COMPLETED | OUTPATIENT
Start: 2022-08-18 | End: 2022-08-18

## 2022-08-18 RX ADMIN — Medication 650 MILLIGRAM(S): at 14:15

## 2022-08-18 RX ADMIN — Medication 650 MILLIGRAM(S): at 13:45

## 2022-08-18 NOTE — ED PROVIDER NOTE - PROVIDER TOKENS
PROVIDER:[TOKEN:[[id number]],FOLLOWUP:[1-3 Days],ESTABLISHEDPATIENT:[T]],PROVIDER:[TOKEN:[[id number]],FOLLOWUP:[1-3 Days]]

## 2022-08-18 NOTE — ED PROVIDER NOTE - CLINICAL SUMMARY MEDICAL DECISION MAKING FREE TEXT BOX
Patient here with mechanical fall sustaining a left knee and ankle injury.  X-rays show no fracture.  Patient likely with ankle sprain placed in splint outpatient follow-up.

## 2022-08-18 NOTE — ED ADULT TRIAGE NOTE - WEIGHT IN KG
113.4 [facility] Counseling:  I discussed with the patient the risks of golimumab including but not limited to myelosuppression, immunosuppression, autoimmune hepatitis, demyelinating diseases, lymphoma, and serious infections.  The patient understands that monitoring is required including a PPD at baseline and must alert us or the primary physician if symptoms of infection or other concerning signs are noted.

## 2022-08-18 NOTE — ED PROVIDER NOTE - CARE PROVIDER_API CALL
[place])  Internal Medicine  [address] [place] [address]  Phone: [phone]  Fax: [phone]  [facility] Patient  Follow Up Time: 1-3 Days    Christiano Hare)  Orthopaedic Surgery  [address] [place] [id number]  Phone: [phone]  Fax: [phone]  Follow Up Time: 1-3 Days

## 2022-08-18 NOTE — ED PROVIDER NOTE - ATTENDING CONTRIBUTION TO CARE
Agree with above history and exam  Impression  Patient here with mechanical fall sustaining a left knee and ankle injury.  X-rays show no fracture.  Patient likely with ankle sprain placed in splint outpatient follow-up.

## 2022-08-18 NOTE — ED PROVIDER NOTE - IV ALTEPLASE EXCL ABS HIDDEN
- h/o seizures on keppra 500mg BID  - admit with AMS, EEG 6/10 with encephalopathy  - neuro consulted for EEG findings, no seizures seen on EEG, rec increase keppra to 1500mg BID   show

## 2022-08-18 NOTE — ED PROVIDER NOTE - PATIENT PORTAL LINK FT
You can access the FollowMyHealth Patient Portal offered by [facility] by registering at the following website: [email or web address]. By joining [facility]’s FollowMyHealth portal, you will also be able to view your health information using other applications (apps) compatible with our system.

## 2022-08-18 NOTE — ED PROVIDER NOTE - NSFOLLOWUPINSTRUCTIONS_ED_ALL_ED_FT

## 2022-08-18 NOTE — ED PROVIDER NOTE - CARE PLAN
1 Principal Discharge DX:	Ankle pain   Principal Discharge DX:	Ankle pain  Secondary Diagnosis:	Left knee pain

## 2022-08-18 NOTE — ED PROVIDER NOTE - NS ED ROS FT
Constitutional: No fatigue, confusion, or amnesia.  Head: No headache  ENT: No visual changes.  Cardiac:  No chest pain or SOB.  Respiratory:  No respiratory distress or hemoptysis.  GI:  No nausea, vomiting, or abdominal pain.  :  No incontinence.  MS:  Reports L ankle pain.  Neuro:  No dizziness, LOC, paralysis, or N/T.  Skin:  Reports R knee abrasion

## 2022-08-18 NOTE — ED PROVIDER NOTE - OBJECTIVE STATEMENT
20 yo female w/ PMH of asthma, s/p gastric sleeve presents for L ankle pain x 1 hour.  Tripped on a piece of wood, does not remember exactly how she injured L ankle, but fell onto her knees, no head trauma.  Pain worse with palpation and movement, sharp pain, bilateral malleoli, no radiation, denies having had before.  Reports abrasion to R knee. No fevers, N/T, or paralysis.

## 2022-08-18 NOTE — ED PROCEDURE NOTE - CPROC ED POST PROC CARE GUIDE1
Verbal/written post procedure instructions were given to patient/caregiver./Instructed patient/caregiver to follow-up with primary care physician./Instructed patient/caregiver regarding signs and symptoms of infection./Elevate the injured extremity as instructed./Keep the cast/splint/dressing clean and dry. Normal

## 2022-08-18 NOTE — ED PROVIDER NOTE - PHYSICAL EXAMINATION
CONSTITUTIONAL: NAD.   SKIN: Superficial abrasion to R knee  HEAD: NCAT  EYES: PERRLA, EOMI  NECK: No posterior midline cervical tenderness  CARD: +S1, S2 no murmurs, gallops, or rubs. Regular rate and rhythm. Radial, DP, PT 2+/4 bilaterally  RESP: LCTAB. No wheezes, rales or rhonchi.  ABD: Abdomen soft, nontender, nondistended.  NEURO: Alert, oriented. Limited L ankle strength testing due to pain. Sensation intact in LEs  MSK: TTP over R knee and L lateral/medial malleoli. No fibular head TTP or other LE TTP. No chest wall tenderness or crepitus  BACK: No posterior midline tenderness  PSYCH: Cooperative, appropriate.

## 2022-08-23 ENCOUNTER — APPOINTMENT (OUTPATIENT)
Dept: ORTHOPEDIC SURGERY | Facility: CLINIC | Age: 22
End: 2022-08-23

## 2022-08-23 VITALS — WEIGHT: 293 LBS | BODY MASS INDEX: 48.82 KG/M2 | HEIGHT: 65 IN

## 2022-08-23 DIAGNOSIS — S93.402A SPRAIN OF UNSPECIFIED LIGAMENT OF LEFT ANKLE, INITIAL ENCOUNTER: ICD-10-CM

## 2022-08-23 PROCEDURE — 73630 X-RAY EXAM OF FOOT: CPT | Mod: LT

## 2022-08-23 PROCEDURE — 73610 X-RAY EXAM OF ANKLE: CPT | Mod: LT

## 2022-08-23 PROCEDURE — 99072 ADDL SUPL MATRL&STAF TM PHE: CPT

## 2022-08-23 PROCEDURE — L4361: CPT | Mod: ACP,LT

## 2022-08-23 PROCEDURE — 99203 OFFICE O/P NEW LOW 30 MIN: CPT | Mod: PA

## 2022-08-23 NOTE — DISCUSSION/SUMMARY
[de-identified] :   At this time I placed her in a CAM walker boot, she can weightbear as tolerated in the boot.  Rest, ice, elevate.  Tylenol as needed for pain, she cannot take anti-inflammatories  because she just had bariatric surgery. The patient understands that these injuries take 4-6 weeks to heal. The 1st 2 weeks are always the worst. Bruising and swelling is common for the next several weeks.  The more they are on their feet in a day, the more swollen they will be at the end of the day.  We will schedule her follow-up in a few weeks repeat evaluation. Patient will call me if any other problems or concerns.  Patient verbalized understanding and agreed with the plan, all questions were answered in the office today.\par \par

## 2022-08-23 NOTE — HISTORY OF PRESENT ILLNESS
[de-identified] :  21-year-old female is here today for evaluation of her left ankle.  Patient states she was at work on Thursday she tripped over a piece of wood rolled her left foot and ankle and fell.  She went to the hospital and had x-rays taken and was placed in a splint.  She states she is still having pain and difficulty putting weight on the left foot.  She denies any numbness tingling in the foot or any calf pain.

## 2022-08-23 NOTE — IMAGING
[de-identified] :   On examination of her left foot and ankle she has swelling and ecchymosis to the lateral aspect of the left ankle and foot.  He is very tender over the lateral malleolus ATFL, CFL, PT FL.  Minimally tender over the deltoid ligament.  No tenderness over the medial malleolus.  She is nontender over the talar dome.  She is tender over the 4th and 5th metatarsals.  She is nontender over the Lisfranc joint.  She is nontender over the Achilles, negative Gomes's test, no calf tenderness.  She has no tenderness palpation of the calcaneus.  She is able to dorsiflex and plantar flex the ankle but has some decreased range of motion due to pain.  Sensation is intact throughout, 2+ DP and PT pulses.\par \par   X-rays taken at the hospital of the left ankle show no acute fractures, dislocations, or other bony abnormalities.\par   Repeat x-rays taken in the office today of the left ankle and foot, weight-bearing, show no acute fractures, dislocations, other bony abnormalities.

## 2022-09-08 ENCOUNTER — APPOINTMENT (OUTPATIENT)
Dept: GASTROENTEROLOGY | Facility: CLINIC | Age: 22
End: 2022-09-08

## 2022-09-23 ENCOUNTER — APPOINTMENT (OUTPATIENT)
Dept: ORTHOPEDIC SURGERY | Facility: CLINIC | Age: 22
End: 2022-09-23

## 2022-09-23 RX ORDER — CALCIUM CARB/VITAMIN D3/VIT K1 650MG-12.5
TABLET,CHEWABLE ORAL DAILY
Refills: 0 | Status: ACTIVE | COMMUNITY

## 2022-09-23 RX ORDER — PEDI MULTIVIT NO.25/FOLIC ACID 300 MCG
TABLET,CHEWABLE ORAL DAILY
Refills: 0 | Status: ACTIVE | COMMUNITY

## 2022-09-27 ENCOUNTER — APPOINTMENT (OUTPATIENT)
Dept: SURGERY | Facility: CLINIC | Age: 22
End: 2022-09-27

## 2023-01-01 NOTE — H&P PST ADULT - REASON FOR ADMISSION
7
Patient is a 21  year old  female presenting to [facility] in preparation for Laparascopic sleeve gastrectomy, possible hiatal hernia repair, possible open, possible intraoperative endoscopy and all indicated procedures,   on  6/27/22  under genera anesthesia by Dr. joseph

## 2023-02-28 ENCOUNTER — NON-APPOINTMENT (OUTPATIENT)
Age: 23
End: 2023-02-28

## 2023-03-07 ENCOUNTER — APPOINTMENT (OUTPATIENT)
Dept: SURGERY | Facility: CLINIC | Age: 23
End: 2023-03-07
Payer: MEDICAID

## 2023-03-07 VITALS
HEIGHT: 65 IN | SYSTOLIC BLOOD PRESSURE: 110 MMHG | HEART RATE: 81 BPM | OXYGEN SATURATION: 98 % | BODY MASS INDEX: 46.65 KG/M2 | WEIGHT: 280 LBS | TEMPERATURE: 97.7 F | DIASTOLIC BLOOD PRESSURE: 72 MMHG

## 2023-03-07 DIAGNOSIS — E66.9 OBESITY, UNSPECIFIED: ICD-10-CM

## 2023-03-07 DIAGNOSIS — J45.909 UNSPECIFIED ASTHMA, UNCOMPLICATED: ICD-10-CM

## 2023-03-07 PROCEDURE — 99213 OFFICE O/P EST LOW 20 MIN: CPT

## 2023-03-08 PROBLEM — E66.9 CLASS 2 OBESITY: Status: ACTIVE | Noted: 2022-07-25

## 2023-03-08 PROBLEM — J45.909 ASTHMA: Status: ACTIVE | Noted: 2020-07-31

## 2023-03-08 NOTE — PHYSICAL EXAM
[Normal] : normoactive bowel sounds, soft and nontender, no hepatosplenomegaly or masses appreciated. Incisions healing appropriately without erythema or drainage [de-identified] : Soft, nondistended

## 2023-03-08 NOTE — PLAN
[FreeTextEntry1] : Plan: Add green leafy vegetables/salads/fruits.\par          Blood work.\par          RTO in 3 months.\par          Call with concerns.

## 2023-03-08 NOTE — ASSESSMENT
[FreeTextEntry1] : ELISE CUEVAS is a 22 year female seen today for bariatric follow up visit. .\par Breakfast - protein shakes or eggs.\par Lunch - [employer] chicken minnie\par Dinner - Rotisserie chicken, beef stew with vegetables\par Snacks - [employer] protein bars, or protein chips. Recommend adding green leafy vegetables, salads and fruits. \par Fluid intake is adequate with flavored mostly water and Crystal Light.\par Walking for exercise and is also doing Samantha a few days

## 2023-05-23 ENCOUNTER — APPOINTMENT (OUTPATIENT)
Dept: SURGERY | Facility: CLINIC | Age: 23
End: 2023-05-23

## 2023-06-23 NOTE — ED ADULT NURSE NOTE - NSICDXPASTSURGICALHX_GEN_ALL_CORE_FT
PAST SURGICAL HISTORY:  H/O gastric sleeve     S/P tonsillectomy and adenoidectomy 2011     Heterosexual

## 2023-10-16 ENCOUNTER — EMERGENCY (EMERGENCY)
Facility: HOSPITAL | Age: 23
LOS: 0 days | Discharge: HOME | End: 2023-10-16
Attending: EMERGENCY MEDICINE
Payer: COMMERCIAL

## 2023-10-16 VITALS
RESPIRATION RATE: 17 BRPM | WEIGHT: 279.99 LBS | SYSTOLIC BLOOD PRESSURE: 106 MMHG | OXYGEN SATURATION: 96 % | HEART RATE: 62 BPM | TEMPERATURE: 97 F | DIASTOLIC BLOOD PRESSURE: 67 MMHG | HEIGHT: 65 IN

## 2023-10-16 DIAGNOSIS — M25.569 PAIN IN UNSPECIFIED KNEE: ICD-10-CM

## 2023-10-16 DIAGNOSIS — J45.909 UNSPECIFIED ASTHMA, UNCOMPLICATED: ICD-10-CM

## 2023-10-16 DIAGNOSIS — S39.012A STRAIN OF MUSCLE, FASCIA AND TENDON OF LOWER BACK, INITIAL ENCOUNTER: ICD-10-CM

## 2023-10-16 DIAGNOSIS — Z98.84 BARIATRIC SURGERY STATUS: ICD-10-CM

## 2023-10-16 DIAGNOSIS — Z90.89 ACQUIRED ABSENCE OF OTHER ORGANS: Chronic | ICD-10-CM
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
## 2023-10-16 DIAGNOSIS — S16.1XXA STRAIN OF MUSCLE, FASCIA AND TENDON AT NECK LEVEL, INITIAL ENCOUNTER: ICD-10-CM

## 2023-10-16 DIAGNOSIS — M54.2 CERVICALGIA: ICD-10-CM
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
## 2023-10-16 DIAGNOSIS — G47.33 OBSTRUCTIVE SLEEP APNEA (ADULT) (PEDIATRIC): ICD-10-CM

## 2023-10-16 DIAGNOSIS — Z90.09 ACQUIRED ABSENCE OF OTHER PART OF HEAD AND NECK: ICD-10-CM

## 2023-10-16 DIAGNOSIS — Z90.3 ACQUIRED ABSENCE OF STOMACH [PART OF]: Chronic | ICD-10-CM

## 2023-10-16 DIAGNOSIS — S80.02XA CONTUSION OF LEFT KNEE, INITIAL ENCOUNTER: ICD-10-CM

## 2023-10-16 DIAGNOSIS — M54.50 LOW BACK PAIN, UNSPECIFIED: ICD-10-CM

## 2023-10-16 PROCEDURE — 99284 EMERGENCY DEPT VISIT MOD MDM: CPT

## 2023-10-16 PROCEDURE — 99283 EMERGENCY DEPT VISIT LOW MDM: CPT

## 2023-10-16 RX ORDER — IBUPROFEN 200 MG
3 TABLET ORAL
Qty: 84 | Refills: 0
Start: 2023-10-16 | End: 2023-10-22

## 2023-10-16 RX ORDER — METHOCARBAMOL 500 MG/1
750 TABLET, FILM COATED ORAL ONCE
Refills: 0 | Status: COMPLETED | OUTPATIENT
Start: 2023-10-16 | End: 2023-10-16

## 2023-10-16 RX ORDER — IBUPROFEN 200 MG
600 TABLET ORAL ONCE
Refills: 0 | Status: COMPLETED | OUTPATIENT
Start: 2023-10-16 | End: 2023-10-16

## 2023-10-16 RX ADMIN — METHOCARBAMOL 750 MILLIGRAM(S): 500 TABLET, FILM COATED ORAL at 15:45

## 2023-10-16 RX ADMIN — Medication 600 MILLIGRAM(S): at 15:45
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
## 2023-10-16 NOTE — ED ADULT NURSE NOTE - NSFALLUNIVINTERV_ED_ALL_ED
Bed/Stretcher in lowest position, wheels locked, appropriate side rails in place/Call bell, personal items and telephone in reach/Instruct patient to call for assistance before getting out of bed/chair/stretcher/Non-slip footwear applied when patient is off stretcher/[place] to call system/Physically safe environment - no spills, clutter or unnecessary equipment/Purposeful proactive rounding/Room/bathroom lighting operational, light cord in reach

## 2023-10-16 NOTE — ED PROVIDER NOTE - PATIENT PORTAL LINK FT
You can access the FollowMyHealth Patient Portal offered by [facility] by registering at the following website: [email or web address]. By joining [employer]’s FollowMyHealth portal, you will also be able to view your health information using other applications (apps) compatible with our system.
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
## 2023-10-16 NOTE — ED PROVIDER NOTE - PHYSICAL EXAMINATION
CONSTITUTIONAL: Well-developed; well-nourished; in no acute distress.   SKIN: Warm, dry  HEAD: Normocephalic; atraumatic  EYES: PERRL, EOMI, normal sclera and conjunctiva   ENT: No nasal discharge; airway clear.  NECK: Supple; non tender. No midline spinal tenderness. Paravertebral muscular tightness  CARD:  Regular rate and rhythm. Normal S1, S2.   RESP: No increased WOB. CTA b/l without wheezes, crackles, rhonchi. No seatbelt sign.  ABD: Normoactive BS. Soft, nontender, nondistended.   EXT: Normal ROM. Normal gait. B/l knee nontender, no deformities  NEURO: Alert, oriented, grossly unremarkable  PSYCH: Cooperative, appropriate.

## 2023-11-03 LAB — H PYLORI AG STL QL: NOT DETECTED

## 2023-12-08 RX ORDER — OMEPRAZOLE 20 MG/1
20 TABLET, DELAYED RELEASE ORAL DAILY
Qty: 56 | Refills: 2 | Status: DISCONTINUED | COMMUNITY
Start: 2022-06-20 | End: 2023-12-08

## 2023-12-08 RX ORDER — URSODIOL 300 MG/1
300 CAPSULE ORAL
Qty: 60 | Refills: 5 | Status: DISCONTINUED | COMMUNITY
Start: 2022-07-25 | End: 2023-12-08

## 2023-12-11 ENCOUNTER — APPOINTMENT (OUTPATIENT)
Dept: SURGERY | Facility: CLINIC | Age: 23
End: 2023-12-11

## 2024-03-25 ENCOUNTER — EMERGENCY (EMERGENCY)
Facility: HOSPITAL | Age: 24
LOS: 0 days | Discharge: LEFT BEFORE DISCHARGE | End: 2024-03-25
Attending: EMERGENCY MEDICINE
Payer: MEDICAID

## 2024-03-25 VITALS
OXYGEN SATURATION: 98 % | SYSTOLIC BLOOD PRESSURE: 111 MMHG | DIASTOLIC BLOOD PRESSURE: 62 MMHG | TEMPERATURE: 99 F | RESPIRATION RATE: 18 BRPM | HEART RATE: 71 BPM | HEIGHT: 65 IN | WEIGHT: 285.06 LBS

## 2024-03-25 DIAGNOSIS — O99.351 DISEASES OF THE NERVOUS SYSTEM COMPLICATING PREGNANCY, FIRST TRIMESTER: ICD-10-CM

## 2024-03-25 DIAGNOSIS — Z90.3 ACQUIRED ABSENCE OF STOMACH [PART OF]: Chronic | ICD-10-CM

## 2024-03-25 DIAGNOSIS — O21.9 VOMITING OF PREGNANCY, UNSPECIFIED: ICD-10-CM

## 2024-03-25 DIAGNOSIS — Z53.29 PROCEDURE AND TREATMENT NOT CARRIED OUT BECAUSE OF PATIENT'S DECISION FOR OTHER REASONS: ICD-10-CM

## 2024-03-25 DIAGNOSIS — Z90.89 ACQUIRED ABSENCE OF OTHER ORGANS: Chronic | ICD-10-CM

## 2024-03-25 DIAGNOSIS — J45.909 UNSPECIFIED ASTHMA, UNCOMPLICATED: ICD-10-CM

## 2024-03-25 DIAGNOSIS — Z98.84 BARIATRIC SURGERY STATUS: ICD-10-CM

## 2024-03-25 DIAGNOSIS — R50.9 FEVER, UNSPECIFIED: ICD-10-CM

## 2024-03-25 DIAGNOSIS — O99.511 DISEASES OF THE RESPIRATORY SYSTEM COMPLICATING PREGNANCY, FIRST TRIMESTER: ICD-10-CM

## 2024-03-25 DIAGNOSIS — J02.9 ACUTE PHARYNGITIS, UNSPECIFIED: ICD-10-CM

## 2024-03-25 DIAGNOSIS — O99.891 OTHER SPECIFIED DISEASES AND CONDITIONS COMPLICATING PREGNANCY: ICD-10-CM

## 2024-03-25 DIAGNOSIS — R05.1 ACUTE COUGH: ICD-10-CM

## 2024-03-25 DIAGNOSIS — Z3A.01 LESS THAN 8 WEEKS GESTATION OF PREGNANCY: ICD-10-CM

## 2024-03-25 DIAGNOSIS — Z86.2 PERSONAL HISTORY OF DISEASES OF THE BLOOD AND BLOOD-FORMING ORGANS AND CERTAIN DISORDERS INVOLVING THE IMMUNE MECHANISM: ICD-10-CM

## 2024-03-25 DIAGNOSIS — R53.81 OTHER MALAISE: ICD-10-CM

## 2024-03-25 DIAGNOSIS — O21.0 MILD HYPEREMESIS GRAVIDARUM: ICD-10-CM

## 2024-03-25 DIAGNOSIS — G47.33 OBSTRUCTIVE SLEEP APNEA (ADULT) (PEDIATRIC): ICD-10-CM

## 2024-03-25 LAB
ALBUMIN SERPL ELPH-MCNC: 4.4 G/DL — SIGNIFICANT CHANGE UP (ref 3.5–5.2)
ALP SERPL-CCNC: 38 U/L — SIGNIFICANT CHANGE UP (ref 30–115)
ALT FLD-CCNC: 9 U/L — SIGNIFICANT CHANGE UP (ref 0–41)
ANION GAP SERPL CALC-SCNC: 9 MMOL/L — SIGNIFICANT CHANGE UP (ref 7–14)
AST SERPL-CCNC: 11 U/L — SIGNIFICANT CHANGE UP (ref 0–41)
BASOPHILS # BLD AUTO: 0.07 K/UL — SIGNIFICANT CHANGE UP (ref 0–0.2)
BASOPHILS NFR BLD AUTO: 0.9 % — SIGNIFICANT CHANGE UP (ref 0–1)
BILIRUB SERPL-MCNC: 0.4 MG/DL — SIGNIFICANT CHANGE UP (ref 0.2–1.2)
BLD GP AB SCN SERPL QL: SIGNIFICANT CHANGE UP
BUN SERPL-MCNC: 9 MG/DL — LOW (ref 10–20)
CALCIUM SERPL-MCNC: 9.4 MG/DL — SIGNIFICANT CHANGE UP (ref 8.4–10.5)
CHLORIDE SERPL-SCNC: 105 MMOL/L — SIGNIFICANT CHANGE UP (ref 98–110)
CO2 SERPL-SCNC: 23 MMOL/L — SIGNIFICANT CHANGE UP (ref 17–32)
CREAT SERPL-MCNC: 0.6 MG/DL — LOW (ref 0.7–1.5)
EGFR: 128 ML/MIN/1.73M2 — SIGNIFICANT CHANGE UP
EOSINOPHIL # BLD AUTO: 0.1 K/UL — SIGNIFICANT CHANGE UP (ref 0–0.7)
EOSINOPHIL NFR BLD AUTO: 1.2 % — SIGNIFICANT CHANGE UP (ref 0–8)
GLUCOSE SERPL-MCNC: 81 MG/DL — SIGNIFICANT CHANGE UP (ref 70–99)
HCG SERPL-ACNC: HIGH MIU/ML
HCT VFR BLD CALC: 36.4 % — LOW (ref 37–47)
HGB BLD-MCNC: 11.3 G/DL — LOW (ref 12–16)
IMM GRANULOCYTES NFR BLD AUTO: 0.4 % — HIGH (ref 0.1–0.3)
LIDOCAIN IGE QN: 16 U/L — SIGNIFICANT CHANGE UP (ref 7–60)
LYMPHOCYTES # BLD AUTO: 1.24 K/UL — SIGNIFICANT CHANGE UP (ref 1.2–3.4)
LYMPHOCYTES # BLD AUTO: 15.3 % — LOW (ref 20.5–51.1)
MCHC RBC-ENTMCNC: 24.7 PG — LOW (ref 27–31)
MCHC RBC-ENTMCNC: 31 G/DL — LOW (ref 32–37)
MCV RBC AUTO: 79.6 FL — LOW (ref 81–99)
MONOCYTES # BLD AUTO: 0.62 K/UL — HIGH (ref 0.1–0.6)
MONOCYTES NFR BLD AUTO: 7.7 % — SIGNIFICANT CHANGE UP (ref 1.7–9.3)
NEUTROPHILS # BLD AUTO: 6.02 K/UL — SIGNIFICANT CHANGE UP (ref 1.4–6.5)
NEUTROPHILS NFR BLD AUTO: 74.5 % — SIGNIFICANT CHANGE UP (ref 42.2–75.2)
NRBC # BLD: 0 /100 WBCS — SIGNIFICANT CHANGE UP (ref 0–0)
PLATELET # BLD AUTO: 169 K/UL — SIGNIFICANT CHANGE UP (ref 130–400)
PMV BLD: 11.9 FL — HIGH (ref 7.4–10.4)
POTASSIUM SERPL-MCNC: 4.5 MMOL/L — SIGNIFICANT CHANGE UP (ref 3.5–5)
POTASSIUM SERPL-SCNC: 4.5 MMOL/L — SIGNIFICANT CHANGE UP (ref 3.5–5)
PROT SERPL-MCNC: 7.3 G/DL — SIGNIFICANT CHANGE UP (ref 6–8)
RBC # BLD: 4.57 M/UL — SIGNIFICANT CHANGE UP (ref 4.2–5.4)
RBC # FLD: 14.6 % — HIGH (ref 11.5–14.5)
SODIUM SERPL-SCNC: 137 MMOL/L — SIGNIFICANT CHANGE UP (ref 135–146)
WBC # BLD: 8.08 K/UL — SIGNIFICANT CHANGE UP (ref 4.8–10.8)
WBC # FLD AUTO: 8.08 K/UL — SIGNIFICANT CHANGE UP (ref 4.8–10.8)

## 2024-03-25 PROCEDURE — 99284 EMERGENCY DEPT VISIT MOD MDM: CPT

## 2024-03-25 PROCEDURE — 76817 TRANSVAGINAL US OBSTETRIC: CPT | Mod: 26

## 2024-03-25 PROCEDURE — 84702 CHORIONIC GONADOTROPIN TEST: CPT

## 2024-03-25 PROCEDURE — 83690 ASSAY OF LIPASE: CPT

## 2024-03-25 PROCEDURE — 80053 COMPREHEN METABOLIC PANEL: CPT

## 2024-03-25 PROCEDURE — 36415 COLL VENOUS BLD VENIPUNCTURE: CPT

## 2024-03-25 PROCEDURE — 96374 THER/PROPH/DIAG INJ IV PUSH: CPT

## 2024-03-25 PROCEDURE — 76830 TRANSVAGINAL US NON-OB: CPT | Mod: 26

## 2024-03-25 PROCEDURE — 85025 COMPLETE CBC W/AUTO DIFF WBC: CPT

## 2024-03-25 PROCEDURE — 99284 EMERGENCY DEPT VISIT MOD MDM: CPT | Mod: 25

## 2024-03-25 PROCEDURE — 76830 TRANSVAGINAL US NON-OB: CPT

## 2024-03-25 PROCEDURE — 86850 RBC ANTIBODY SCREEN: CPT

## 2024-03-25 PROCEDURE — 86901 BLOOD TYPING SEROLOGIC RH(D): CPT

## 2024-03-25 PROCEDURE — 86900 BLOOD TYPING SEROLOGIC ABO: CPT

## 2024-03-25 RX ORDER — ONDANSETRON 8 MG/1
4 TABLET, FILM COATED ORAL ONCE
Refills: 0 | Status: COMPLETED | OUTPATIENT
Start: 2024-03-25 | End: 2024-03-25

## 2024-03-25 RX ORDER — FAMOTIDINE 10 MG/ML
20 INJECTION INTRAVENOUS ONCE
Refills: 0 | Status: COMPLETED | OUTPATIENT
Start: 2024-03-25 | End: 2024-03-25

## 2024-03-25 RX ORDER — SODIUM CHLORIDE 9 MG/ML
1000 INJECTION INTRAMUSCULAR; INTRAVENOUS; SUBCUTANEOUS ONCE
Refills: 0 | Status: DISCONTINUED | OUTPATIENT
Start: 2024-03-25 | End: 2024-03-25

## 2024-03-25 RX ADMIN — ONDANSETRON 4 MILLIGRAM(S): 8 TABLET, FILM COATED ORAL at 14:50

## 2024-03-25 NOTE — ED PROVIDER NOTE - ATTENDING CONTRIBUTION TO CARE
23-year-old female past medical history asthma anemia G1, currently 7 weeks pregnant per patient, LMP 2/1, presents with 3 weeks of intractable nonbilious nonbloody emesis 3-4 times a day.  Has associated 2 days of subjective fevers cough sore throat body aches malaise.  No abdominal pain.  No vaginal bleeding or discharge.  No dysuria frequency hematuria.  Unable to tolerate p.o.  States has appointment with Dr. Rinaldi on Thursday, March 28.  Has not had ultrasound with this pregnancy yet.    On exam, AFVSS, Well appearing, No acute distress, NCAT, EOMI, PERRLA, dry MM, op clear no erythema or exudates, Neck supple, LCTAB, RRR nl s1s2 No mrg, Abdomen Soft NTND, AAOx3, No Focal Deficits, No LE edema or calf TTP,    A/P; hyperemesis gravidarum, flulike symptoms, lungs clear to auscultation bilaterally, will do labs IV fluids antiemetics urine sono reeval

## 2024-03-25 NOTE — ED PROVIDER NOTE - CLINICAL SUMMARY MEDICAL DECISION MAKING FREE TEXT BOX
Patient refusing to stay for sono read or lab results, feels better, understands risks of death severe morbidity, has capacity to refuse, understands can come back at any time if changes mind will have to sign out AMA.  Patient is awake/alert/interactive with normal mental status and normal neurologic function. Patient reports no SI/HI and demonstrates normal thought processes with no evidence of intoxication, delirium, delusions or hallucinations. Patient requesting to leave against medical advice at this time. Advised patient of potential risks of leaving AMA which include potential disability or death. Attempted to convince patient to stay and continue work up/treatment and patient refused. Patient has capacity to make medical decisions at this time and will be signed out AMA. Patient instructed to follow up with his primary care provider as an outpatient and is aware they can return to the emergency department at any time for evaluation.

## 2024-03-25 NOTE — ED PROVIDER NOTE - PATIENT PORTAL LINK FT
You can access the FollowMyHealth Patient Portal offered by [facility] by registering at the following website: [email or web address]. By joining [employer]’s FollowMyHealth portal, you will also be able to view your health information using other applications (apps) compatible with our system.

## 2024-03-25 NOTE — ED ADULT NURSE NOTE - BIRTH SEX
Detail Level: Detailed
Female
Quality 226: Preventive Care And Screening: Tobacco Use: Screening And Cessation Intervention: Patient screened for tobacco use and is an ex/non-smoker
Quality 111:Pneumonia Vaccination Status For Older Adults: Pneumococcal Vaccination Previously Received
Quality 130: Documentation Of Current Medications In The Medical Record: Current Medications Documented

## 2024-03-25 NOTE — ED PROVIDER NOTE - PHYSICAL EXAMINATION
Constitutional: Well developed, well nourished, no acute distress  Head: Normocephalic, Atraumatic  Eyes: PERRLA, EOMI, conjunctiva and sclera WNL  ENT: Moist mucous membranes, no rhinorrhea, Clear oropharynx with no pharyngeal exudate or stomatitis  Neck: Supple, Nontender,  No acute cervical adenopathy  Respiratory: Normal chest excursion with respiration; Breath sounds clear and equal B/L; No wheezes, rales, or rhonchi   Cardiovascular: RRR; Normal S1, S2; No murmurs, rubs or gallops   ABD/GI:  Nondistended; Nontender; No guarding, rigidity or rebound;   EXT/MS: Moving all extremities; Distal pulses 2+ B/L   Neurologic: AAO x 4;  Normal motor and sensory function

## 2024-03-25 NOTE — ED PROVIDER NOTE - OBJECTIVE STATEMENT
23-year-old female 7 weeks pregnant with past ministry of MATIAS, asthma, gastric sleeve, anemia status post iron transfusion presenting with 3 weeks of vomiting.  Patient states this is her first pregnancy and has not yet had a confirmed IUP via ultrasound but has followed with Dr. Dickens OB/GYN.  LMP 2/1/2024.  Patient states the past 2 days she has been having subjective fevers, cough, headache, generalized weakness, sore throat, constipation.  No abdominal pain, chest pain, shortness of breath, diarrhea, blood in her vomit,  symptoms, vaginal bleeding. 23-year-old female 7 weeks pregnant with pmh of MATIAS, asthma, gastric sleeve, anemia status post iron transfusion presenting with 3 weeks of vomiting.  Patient states this is her first pregnancy and has not yet had a confirmed IUP via ultrasound but has followed with Dr. Dickens OB/GYN.  LMP 2/1/2024.  Patient states the past 2 days she has been having subjective fevers, cough, headache, generalized weakness, sore throat, constipation.  No abdominal pain, chest pain, shortness of breath, diarrhea, blood in her vomit,  symptoms, vaginal bleeding.

## 2024-03-28 ENCOUNTER — APPOINTMENT (OUTPATIENT)
Dept: OBGYN | Facility: CLINIC | Age: 24
End: 2024-03-28
Payer: MEDICAID

## 2024-03-28 VITALS
SYSTOLIC BLOOD PRESSURE: 105 MMHG | BODY MASS INDEX: 48.32 KG/M2 | DIASTOLIC BLOOD PRESSURE: 65 MMHG | WEIGHT: 290 LBS | HEIGHT: 65 IN

## 2024-03-28 DIAGNOSIS — Z34.01 ENCOUNTER FOR SUPERVISION OF NORMAL FIRST PREGNANCY, FIRST TRIMESTER: ICD-10-CM

## 2024-03-28 DIAGNOSIS — O21.0 MILD HYPEREMESIS GRAVIDARUM: ICD-10-CM

## 2024-03-28 PROCEDURE — 76817 TRANSVAGINAL US OBSTETRIC: CPT

## 2024-03-28 PROCEDURE — 99203 OFFICE O/P NEW LOW 30 MIN: CPT | Mod: 25

## 2024-03-28 RX ORDER — ONDANSETRON 4 MG/1
4 TABLET ORAL
Qty: 90 | Refills: 3 | Status: ACTIVE | COMMUNITY
Start: 2024-03-28 | End: 1900-01-01

## 2024-03-28 RX ORDER — DOXYLAMINE SUCCINATE AND PYRIDOXINE HYDROCHLORIDE 10; 10 MG/1; MG/1
10-10 TABLET, DELAYED RELEASE ORAL
Qty: 60 | Refills: 3 | Status: ACTIVE | COMMUNITY
Start: 2024-03-28 | End: 1900-01-01

## 2024-03-28 RX ORDER — PNV NO.118/IRON FUMARATE/FA 29 MG-1 MG
TABLET,CHEWABLE ORAL DAILY
Qty: 90 | Refills: 3 | Status: ACTIVE | COMMUNITY
Start: 2024-03-28 | End: 1900-01-01

## 2024-03-28 NOTE — PROCEDURE
[Transvaginal OB Sonogram] : Transvaginal OB Sonogram [Intrauterine Pregnancy] : intrauterine pregnancy [Yolk Sac] : yolk sac present [Fetal Heart] : fetal heart present [CRL: ___ (mm)] : CRL - [unfilled]Umm [Date: ___] : Date: [unfilled] [Current GA by Sonogram: ___ (wks)] : Current GA by Sonogram: [unfilled]Uwks [___ day(s)] : [unfilled] days [FreeTextEntry1] : edc 11/14/24 on sonogram 11/7/24 by lmp [Transvaginal OB Sonogram WNL] : Transvaginal OB Sonogram WNL

## 2024-03-29 LAB
ABO + RH PNL BLD: NORMAL
BASOPHILS # BLD AUTO: 0.05 K/UL
BASOPHILS NFR BLD AUTO: 0.6 %
BLD GP AB SCN SERPL QL: NORMAL
C TRACH RRNA SPEC QL NAA+PROBE: NOT DETECTED
COVID-19 NUCLEOCAPSID  GAM ANTIBODY INTERPRETATION: POSITIVE
COVID-19 SPIKE DOMAIN ANTIBODY INTERPRETATION: POSITIVE
EOSINOPHIL # BLD AUTO: 0.14 K/UL
EOSINOPHIL NFR BLD AUTO: 1.7 %
ESTIMATED AVERAGE GLUCOSE: 103 MG/DL
HBA1C MFR BLD HPLC: 5.2 %
HCT VFR BLD CALC: 37.6 %
HGB BLD-MCNC: 11.4 G/DL
HIV1+2 AB SPEC QL IA.RAPID: NONREACTIVE
IMM GRANULOCYTES NFR BLD AUTO: 0.2 %
LYMPHOCYTES # BLD AUTO: 1.63 K/UL
LYMPHOCYTES NFR BLD AUTO: 20.3 %
MAN DIFF?: NORMAL
MCHC RBC-ENTMCNC: 24.6 PG
MCHC RBC-ENTMCNC: 30.3 G/DL
MCV RBC AUTO: 81 FL
MONOCYTES # BLD AUTO: 0.62 K/UL
MONOCYTES NFR BLD AUTO: 7.7 %
N GONORRHOEA RRNA SPEC QL NAA+PROBE: NOT DETECTED
NEUTROPHILS # BLD AUTO: 5.55 K/UL
NEUTROPHILS NFR BLD AUTO: 69.5 %
PLATELET # BLD AUTO: 180 K/UL
PMV BLD AUTO: 0 /100 WBCS
RBC # BLD: 4.64 M/UL
RBC # FLD: 14.9 %
SARS-COV-2 AB SERPL IA-ACNC: >250 U/ML
SARS-COV-2 AB SERPL QL IA: 43.6 INDEX
SOURCE AMPLIFICATION: NORMAL
WBC # FLD AUTO: 8.01 K/UL

## 2024-03-30 LAB
HBV SURFACE AG SERPL QL IA: NONREACTIVE
HCV AB SER QL: NONREACTIVE
HCV S/CO RATIO: 0.13 S/CO
MEV IGG FLD QL IA: 102 AU/ML
MEV IGG+IGM SER-IMP: POSITIVE
RUBV IGG FLD-ACNC: 5 INDEX
RUBV IGG SER-IMP: POSITIVE
T PALLIDUM AB SER QL IA: NEGATIVE

## 2024-04-04 LAB — CFTR MUT TESTED BLD/T: NEGATIVE

## 2024-04-05 LAB — AR GENE MUT ANL BLD/T: NORMAL

## 2024-04-08 ENCOUNTER — NON-APPOINTMENT (OUTPATIENT)
Age: 24
End: 2024-04-08

## 2024-04-11 ENCOUNTER — APPOINTMENT (OUTPATIENT)
Dept: OBGYN | Facility: CLINIC | Age: 24
End: 2024-04-11
Payer: MEDICAID

## 2024-04-11 VITALS — BODY MASS INDEX: 48.43 KG/M2 | SYSTOLIC BLOOD PRESSURE: 105 MMHG | DIASTOLIC BLOOD PRESSURE: 75 MMHG | WEIGHT: 291 LBS

## 2024-04-11 DIAGNOSIS — Z34.01 ENCOUNTER FOR SUPERVISION OF NORMAL FIRST PREGNANCY, FIRST TRIMESTER: ICD-10-CM

## 2024-04-11 PROCEDURE — 99213 OFFICE O/P EST LOW 20 MIN: CPT | Mod: 25

## 2024-04-11 PROCEDURE — 76817 TRANSVAGINAL US OBSTETRIC: CPT

## 2024-04-11 RX ORDER — DOCUSATE SODIUM 100 MG/1
100 CAPSULE ORAL TWICE DAILY
Qty: 60 | Refills: 3 | Status: ACTIVE | COMMUNITY
Start: 2024-04-11 | End: 1900-01-01

## 2024-04-11 NOTE — PROCEDURE
[Transvaginal OB Sonogram] : Transvaginal OB Sonogram [Intrauterine Pregnancy] : intrauterine pregnancy [Yolk Sac] : yolk sac present [Fetal Heart] : fetal heart present [CRL: ___ (mm)] : CRL - [unfilled]Umm [Date: ___] : Date: [unfilled] [Current GA by Sonogram: ___ (wks)] : Current GA by Sonogram: [unfilled]Uwks [___ day(s)] : [unfilled] days [FreeTextEntry1] : edc 11/14/24

## 2024-04-19 ENCOUNTER — EMERGENCY (EMERGENCY)
Facility: HOSPITAL | Age: 24
LOS: 0 days | Discharge: HOME | End: 2024-04-19
Attending: STUDENT IN AN ORGANIZED HEALTH CARE EDUCATION/TRAINING PROGRAM
Payer: MEDICAID

## 2024-04-19 VITALS
OXYGEN SATURATION: 100 % | RESPIRATION RATE: 18 BRPM | HEART RATE: 62 BPM | HEIGHT: 65 IN | SYSTOLIC BLOOD PRESSURE: 128 MMHG | DIASTOLIC BLOOD PRESSURE: 63 MMHG

## 2024-04-19 VITALS — TEMPERATURE: 99 F

## 2024-04-19 DIAGNOSIS — Z90.89 ACQUIRED ABSENCE OF OTHER ORGANS: Chronic | ICD-10-CM

## 2024-04-19 DIAGNOSIS — O21.9 VOMITING OF PREGNANCY, UNSPECIFIED: ICD-10-CM

## 2024-04-19 DIAGNOSIS — O99.891 OTHER SPECIFIED DISEASES AND CONDITIONS COMPLICATING PREGNANCY: ICD-10-CM

## 2024-04-19 DIAGNOSIS — O23.41 UNSPECIFIED INFECTION OF URINARY TRACT IN PREGNANCY, FIRST TRIMESTER: ICD-10-CM

## 2024-04-19 DIAGNOSIS — Z90.3 ACQUIRED ABSENCE OF STOMACH [PART OF]: Chronic | ICD-10-CM

## 2024-04-19 DIAGNOSIS — R07.89 OTHER CHEST PAIN: ICD-10-CM

## 2024-04-19 DIAGNOSIS — O21.0 MILD HYPEREMESIS GRAVIDARUM: ICD-10-CM

## 2024-04-19 DIAGNOSIS — Z3A.11 11 WEEKS GESTATION OF PREGNANCY: ICD-10-CM

## 2024-04-19 LAB
ALBUMIN SERPL ELPH-MCNC: 4 G/DL — SIGNIFICANT CHANGE UP (ref 3.5–5.2)
ALP SERPL-CCNC: 36 U/L — SIGNIFICANT CHANGE UP (ref 30–115)
ALT FLD-CCNC: 31 U/L — SIGNIFICANT CHANGE UP (ref 0–41)
ANION GAP SERPL CALC-SCNC: 13 MMOL/L — SIGNIFICANT CHANGE UP (ref 7–14)
APPEARANCE UR: ABNORMAL
AST SERPL-CCNC: 17 U/L — SIGNIFICANT CHANGE UP (ref 0–41)
BACTERIA # UR AUTO: ABNORMAL /HPF
BASOPHILS # BLD AUTO: 0.04 K/UL — SIGNIFICANT CHANGE UP (ref 0–0.2)
BASOPHILS NFR BLD AUTO: 0.4 % — SIGNIFICANT CHANGE UP (ref 0–1)
BILIRUB SERPL-MCNC: 0.3 MG/DL — SIGNIFICANT CHANGE UP (ref 0.2–1.2)
BILIRUB UR-MCNC: NEGATIVE — SIGNIFICANT CHANGE UP
BUN SERPL-MCNC: 8 MG/DL — LOW (ref 10–20)
CALCIUM SERPL-MCNC: 9.2 MG/DL — SIGNIFICANT CHANGE UP (ref 8.4–10.4)
CAST: 0 /LPF — SIGNIFICANT CHANGE UP (ref 0–4)
CHLORIDE SERPL-SCNC: 101 MMOL/L — SIGNIFICANT CHANGE UP (ref 98–110)
CO2 SERPL-SCNC: 22 MMOL/L — SIGNIFICANT CHANGE UP (ref 17–32)
COLOR SPEC: YELLOW — SIGNIFICANT CHANGE UP
CREAT SERPL-MCNC: 0.5 MG/DL — LOW (ref 0.7–1.5)
DIFF PNL FLD: NEGATIVE — SIGNIFICANT CHANGE UP
EGFR: 133 ML/MIN/1.73M2 — SIGNIFICANT CHANGE UP
EOSINOPHIL # BLD AUTO: 0.08 K/UL — SIGNIFICANT CHANGE UP (ref 0–0.7)
EOSINOPHIL NFR BLD AUTO: 0.7 % — SIGNIFICANT CHANGE UP (ref 0–8)
GLUCOSE SERPL-MCNC: 76 MG/DL — SIGNIFICANT CHANGE UP (ref 70–99)
GLUCOSE UR QL: NEGATIVE MG/DL — SIGNIFICANT CHANGE UP
HCG SERPL-ACNC: HIGH MIU/ML
HCT VFR BLD CALC: 35.9 % — LOW (ref 37–47)
HGB BLD-MCNC: 11.2 G/DL — LOW (ref 12–16)
IMM GRANULOCYTES NFR BLD AUTO: 0.4 % — HIGH (ref 0.1–0.3)
KETONES UR-MCNC: NEGATIVE MG/DL — SIGNIFICANT CHANGE UP
LEUKOCYTE ESTERASE UR-ACNC: ABNORMAL
LYMPHOCYTES # BLD AUTO: 1.97 K/UL — SIGNIFICANT CHANGE UP (ref 1.2–3.4)
LYMPHOCYTES # BLD AUTO: 17.3 % — LOW (ref 20.5–51.1)
MAGNESIUM SERPL-MCNC: 1.7 MG/DL — LOW (ref 1.8–2.4)
MCHC RBC-ENTMCNC: 25.2 PG — LOW (ref 27–31)
MCHC RBC-ENTMCNC: 31.2 G/DL — LOW (ref 32–37)
MCV RBC AUTO: 80.7 FL — LOW (ref 81–99)
MONOCYTES # BLD AUTO: 0.62 K/UL — HIGH (ref 0.1–0.6)
MONOCYTES NFR BLD AUTO: 5.4 % — SIGNIFICANT CHANGE UP (ref 1.7–9.3)
NEUTROPHILS # BLD AUTO: 8.66 K/UL — HIGH (ref 1.4–6.5)
NEUTROPHILS NFR BLD AUTO: 75.8 % — HIGH (ref 42.2–75.2)
NITRITE UR-MCNC: NEGATIVE — SIGNIFICANT CHANGE UP
NRBC # BLD: 0 /100 WBCS — SIGNIFICANT CHANGE UP (ref 0–0)
PH UR: 5.5 — SIGNIFICANT CHANGE UP (ref 5–8)
PLATELET # BLD AUTO: 189 K/UL — SIGNIFICANT CHANGE UP (ref 130–400)
PMV BLD: 12.2 FL — HIGH (ref 7.4–10.4)
POTASSIUM SERPL-MCNC: 4.4 MMOL/L — SIGNIFICANT CHANGE UP (ref 3.5–5)
POTASSIUM SERPL-SCNC: 4.4 MMOL/L — SIGNIFICANT CHANGE UP (ref 3.5–5)
PROT SERPL-MCNC: 6.9 G/DL — SIGNIFICANT CHANGE UP (ref 6–8)
PROT UR-MCNC: NEGATIVE MG/DL — SIGNIFICANT CHANGE UP
RBC # BLD: 4.45 M/UL — SIGNIFICANT CHANGE UP (ref 4.2–5.4)
RBC # FLD: 15.2 % — HIGH (ref 11.5–14.5)
RBC CASTS # UR COMP ASSIST: 4 /HPF — SIGNIFICANT CHANGE UP (ref 0–4)
SODIUM SERPL-SCNC: 136 MMOL/L — SIGNIFICANT CHANGE UP (ref 135–146)
SP GR SPEC: 1.02 — SIGNIFICANT CHANGE UP (ref 1–1.03)
SQUAMOUS # UR AUTO: 20 /HPF — HIGH (ref 0–5)
UROBILINOGEN FLD QL: 0.2 MG/DL — SIGNIFICANT CHANGE UP (ref 0.2–1)
WBC # BLD: 11.42 K/UL — HIGH (ref 4.8–10.8)
WBC # FLD AUTO: 11.42 K/UL — HIGH (ref 4.8–10.8)
WBC UR QL: 9 /HPF — HIGH (ref 0–5)

## 2024-04-19 PROCEDURE — 96374 THER/PROPH/DIAG INJ IV PUSH: CPT

## 2024-04-19 PROCEDURE — 36415 COLL VENOUS BLD VENIPUNCTURE: CPT

## 2024-04-19 PROCEDURE — 84702 CHORIONIC GONADOTROPIN TEST: CPT

## 2024-04-19 PROCEDURE — 85025 COMPLETE CBC W/AUTO DIFF WBC: CPT

## 2024-04-19 PROCEDURE — 81001 URINALYSIS AUTO W/SCOPE: CPT

## 2024-04-19 PROCEDURE — 80053 COMPREHEN METABOLIC PANEL: CPT

## 2024-04-19 PROCEDURE — 76857 US EXAM PELVIC LIMITED: CPT

## 2024-04-19 PROCEDURE — 87086 URINE CULTURE/COLONY COUNT: CPT

## 2024-04-19 PROCEDURE — 99284 EMERGENCY DEPT VISIT MOD MDM: CPT | Mod: 25

## 2024-04-19 PROCEDURE — 83735 ASSAY OF MAGNESIUM: CPT

## 2024-04-19 PROCEDURE — 96361 HYDRATE IV INFUSION ADD-ON: CPT

## 2024-04-19 PROCEDURE — 93005 ELECTROCARDIOGRAM TRACING: CPT

## 2024-04-19 PROCEDURE — 99285 EMERGENCY DEPT VISIT HI MDM: CPT

## 2024-04-19 PROCEDURE — 96375 TX/PRO/DX INJ NEW DRUG ADDON: CPT

## 2024-04-19 PROCEDURE — 93010 ELECTROCARDIOGRAM REPORT: CPT

## 2024-04-19 RX ORDER — MAGNESIUM SULFATE 500 MG/ML
2 VIAL (ML) INJECTION ONCE
Refills: 0 | Status: COMPLETED | OUTPATIENT
Start: 2024-04-19 | End: 2024-04-19

## 2024-04-19 RX ORDER — ONDANSETRON 8 MG/1
1 TABLET, FILM COATED ORAL
Qty: 12 | Refills: 0
Start: 2024-04-19 | End: 2024-04-22

## 2024-04-19 RX ORDER — DOXYLAMINE SUCCINATE AND PYRIDOXINE HYDROCHLORIDE, DELAYED RELEASE TABLETS 10 MG/10 MG 10; 10 MG/1; MG/1
2 TABLET, DELAYED RELEASE ORAL
Qty: 28 | Refills: 0
Start: 2024-04-19 | End: 2024-05-02

## 2024-04-19 RX ORDER — METOCLOPRAMIDE HCL 10 MG
10 TABLET ORAL ONCE
Refills: 0 | Status: COMPLETED | OUTPATIENT
Start: 2024-04-19 | End: 2024-04-19

## 2024-04-19 RX ORDER — CEPHALEXIN 500 MG
1 CAPSULE ORAL
Qty: 14 | Refills: 0
Start: 2024-04-19 | End: 2024-04-25

## 2024-04-19 RX ORDER — FAMOTIDINE 10 MG/ML
20 INJECTION INTRAVENOUS ONCE
Refills: 0 | Status: COMPLETED | OUTPATIENT
Start: 2024-04-19 | End: 2024-04-19

## 2024-04-19 RX ORDER — SODIUM CHLORIDE 9 MG/ML
1000 INJECTION INTRAMUSCULAR; INTRAVENOUS; SUBCUTANEOUS ONCE
Refills: 0 | Status: COMPLETED | OUTPATIENT
Start: 2024-04-19 | End: 2024-04-19

## 2024-04-19 RX ADMIN — Medication 25 GRAM(S): at 18:34

## 2024-04-19 RX ADMIN — FAMOTIDINE 20 MILLIGRAM(S): 10 INJECTION INTRAVENOUS at 17:02

## 2024-04-19 RX ADMIN — Medication 10 MILLIGRAM(S): at 18:06

## 2024-04-19 RX ADMIN — SODIUM CHLORIDE 1000 MILLILITER(S): 9 INJECTION INTRAMUSCULAR; INTRAVENOUS; SUBCUTANEOUS at 18:34

## 2024-04-19 RX ADMIN — SODIUM CHLORIDE 1000 MILLILITER(S): 9 INJECTION INTRAMUSCULAR; INTRAVENOUS; SUBCUTANEOUS at 18:00

## 2024-04-19 RX ADMIN — SODIUM CHLORIDE 2000 MILLILITER(S): 9 INJECTION INTRAMUSCULAR; INTRAVENOUS; SUBCUTANEOUS at 17:02

## 2024-04-19 NOTE — ED PROVIDER NOTE - CLINICAL SUMMARY MEDICAL DECISION MAKING FREE TEXT BOX
23 female  11 weeks pregnant with diagnosis of hyperemesis gravidarum p/w vomiting. endorses approx 3 episodes of NBNB vomiting, no abd pain. endorses acid reflux. no fevers. no dysuria, hematuria, vaginal discharge or bleeding. on exam, nontender abdomen. lungs CTA. no LE edema. labs reviewed- appear stable no evidence of ketosis, normal AG, normal bicarb. ekg independently interpreted by me Dr. Olsen showing NSR, no stemi. given iv fluids, antiemetics with resolution in vomiting. tolerated oral intake in the ED. at this time, stable for dc. will follow up with gyn. return precautions discussed.

## 2024-04-19 NOTE — ED PROVIDER NOTE - PATIENT PORTAL LINK FT
You can access the FollowMyHealth Patient Portal offered by [facility] by registering at the following website: [email or web address]. By joining [employer]’s FollowMyHealth portal, you will also be able to view your health information using other applications (apps) compatible with our system.

## 2024-04-19 NOTE — ED PROVIDER NOTE - CARE PROVIDER_API CALL
Chandan Plunkett.  Obstetrics and Gynecology  [address] [place] [address]  Phone: [phone]  Fax: [phone]  Follow Up Time: 1-3 Days

## 2024-04-19 NOTE — ED PROVIDER NOTE - OBJECTIVE STATEMENT
23 female  11 weeks pregnant presenting to ED for vomiting.  Patient states that she was seen by GYN back at 7 weeks and was prescribed Zofran.  Patient states has been taking Zofran which has not been helping and she is continue to throw up every day.  States that Zofran has taken her from 6 episodes of vomiting a day to 3 but now she is starting to feel discomfort in her chest.  Denies fevers, chills, SOB, abdominal pain, back pain, vaginal bleeding, dysuria/hematuria

## 2024-04-19 NOTE — ED PROVIDER NOTE - CARE PLAN
1 Principal Discharge DX:	Nausea/vomiting in pregnancy  Secondary Diagnosis:	UTI (urinary tract infection)

## 2024-04-19 NOTE — ED ADULT TRIAGE NOTE - CHIEF COMPLAINT QUOTE
Pt c/o n/v worsening despite zofran use   11wk preg, dx HG  states she sometimes gets palpitations when she lays down  denies CP/palpitations today

## 2024-04-19 NOTE — ED PROVIDER NOTE - PHYSICAL EXAMINATION
VITAL SIGNS: I have reviewed nursing notes and confirm.  CONSTITUTIONAL: in no acute distress.  SKIN: Skin exam is warm and dry, no acute rash.  HEAD: Normocephalic; atraumatic.  EYES: EOM intact; conjunctiva and sclera clear.  ENT: No nasal discharge; airway clear.  NECK: Supple; non tender.  CARD: S1, S2 normal; no murmurs, gallops, or rubs. Regular rate and rhythm.  RESP: No wheezes, rales or rhonchi. Speaking in full sentences.   ABD: Normal bowel sounds; soft; non-distended; non-tender; No rebound or guarding.   US:   EXT: Normal ROM. No clubbing, cyanosis or edema.

## 2024-04-21 LAB
CULTURE RESULTS: SIGNIFICANT CHANGE UP
SPECIMEN SOURCE: SIGNIFICANT CHANGE UP

## 2024-05-06 ENCOUNTER — NON-APPOINTMENT (OUTPATIENT)
Age: 24
End: 2024-05-06

## 2024-05-09 ENCOUNTER — APPOINTMENT (OUTPATIENT)
Dept: OBGYN | Facility: CLINIC | Age: 24
End: 2024-05-09
Payer: MEDICAID

## 2024-05-09 VITALS
HEIGHT: 65 IN | DIASTOLIC BLOOD PRESSURE: 80 MMHG | BODY MASS INDEX: 48.82 KG/M2 | SYSTOLIC BLOOD PRESSURE: 121 MMHG | WEIGHT: 293 LBS

## 2024-05-09 LAB
BILIRUB UR QL STRIP: NORMAL
GLUCOSE UR-MCNC: NORMAL
HCG UR QL: 0.2 EU/DL
HGB UR QL STRIP.AUTO: NORMAL
KETONES UR-MCNC: NORMAL
LEUKOCYTE ESTERASE UR QL STRIP: NORMAL
NITRITE UR QL STRIP: NORMAL
PH UR STRIP: 6
PROT UR STRIP-MCNC: NORMAL
SP GR UR STRIP: 1.02

## 2024-05-09 PROCEDURE — 99213 OFFICE O/P EST LOW 20 MIN: CPT

## 2024-05-10 ENCOUNTER — ASOB RESULT (OUTPATIENT)
Age: 24
End: 2024-05-10

## 2024-05-10 ENCOUNTER — APPOINTMENT (OUTPATIENT)
Dept: ANTEPARTUM | Facility: CLINIC | Age: 24
End: 2024-05-10
Payer: MEDICAID

## 2024-05-10 ENCOUNTER — OUTPATIENT (OUTPATIENT)
Dept: OUTPATIENT SERVICES | Facility: HOSPITAL | Age: 24
LOS: 1 days | End: 2024-05-10
Payer: MEDICAID

## 2024-05-10 DIAGNOSIS — Z34.90 ENCOUNTER FOR SUPERVISION OF NORMAL PREGNANCY, UNSPECIFIED, UNSPECIFIED TRIMESTER: ICD-10-CM

## 2024-05-10 DIAGNOSIS — Z90.3 ACQUIRED ABSENCE OF STOMACH [PART OF]: Chronic | ICD-10-CM

## 2024-05-10 DIAGNOSIS — Z90.89 ACQUIRED ABSENCE OF OTHER ORGANS: Chronic | ICD-10-CM

## 2024-05-10 PROCEDURE — 76801 OB US < 14 WKS SINGLE FETUS: CPT | Mod: 26

## 2024-05-10 PROCEDURE — 76801 OB US < 14 WKS SINGLE FETUS: CPT

## 2024-05-10 PROCEDURE — 76813 OB US NUCHAL MEAS 1 GEST: CPT | Mod: 26

## 2024-05-10 PROCEDURE — 76813 OB US NUCHAL MEAS 1 GEST: CPT

## 2024-05-13 DIAGNOSIS — Z3A.13 13 WEEKS GESTATION OF PREGNANCY: ICD-10-CM

## 2024-05-13 DIAGNOSIS — Z36.89 ENCOUNTER FOR OTHER SPECIFIED ANTENATAL SCREENING: ICD-10-CM

## 2024-05-13 DIAGNOSIS — Z36.82 ENCOUNTER FOR ANTENATAL SCREENING FOR NUCHAL TRANSLUCENCY: ICD-10-CM

## 2024-05-13 DIAGNOSIS — O99.211 OBESITY COMPLICATING PREGNANCY, FIRST TRIMESTER: ICD-10-CM

## 2024-05-17 LAB
CHROMOSOME13 INTERPRETATION: NORMAL
CHROMOSOME13 TEST RESULT: NORMAL
CHROMOSOME18 INTERPRETATION: NORMAL
CHROMOSOME18 TEST RESULT: NORMAL
CHROMOSOME21 INTERPRETATION: NORMAL
CHROMOSOME21 TEST RESULT: NORMAL
FETAL FRACTION: NORMAL
PERFORMANCE AND LIMITATIONS: NORMAL
SEX CHROMOSOME INTERPRETATION: NORMAL
SEX CHROMOSOME TEST RESULT: NORMAL
VERIFI PRENATAL TEST: NOT DETECTED

## 2024-06-04 ENCOUNTER — NON-APPOINTMENT (OUTPATIENT)
Age: 24
End: 2024-06-04

## 2024-06-06 ENCOUNTER — APPOINTMENT (OUTPATIENT)
Dept: OBGYN | Facility: CLINIC | Age: 24
End: 2024-06-06
Payer: MEDICAID

## 2024-06-06 VITALS — WEIGHT: 293 LBS | DIASTOLIC BLOOD PRESSURE: 85 MMHG | SYSTOLIC BLOOD PRESSURE: 122 MMHG | BODY MASS INDEX: 50.42 KG/M2

## 2024-06-06 DIAGNOSIS — Z34.02 ENCOUNTER FOR SUPERVISION OF NORMAL FIRST PREGNANCY, SECOND TRIMESTER: ICD-10-CM

## 2024-06-06 PROCEDURE — 99213 OFFICE O/P EST LOW 20 MIN: CPT

## 2024-06-07 LAB
AF-AFP DISCLAIMER: NORMAL
AF-AFP MOM: 2
AFP CONCENTRATION: 44.94 NG/ML
AFP INTERPRETATION: NORMAL
AFP MOM CUT-OFF: 2.5
AFP PERCENTILE: 98.4
AFP SCREENING RESULT: NORMAL
AFTER SCREENING RISK OPEN SPINA BIFIDA: NORMAL
BEFORE SCREENING RISK OPEN SPINA BIFIDA: NORMAL
BILIRUB UR QL STRIP: NORMAL
CARBAMAZEPINE?: NO
CURRENT SMOKER: NO
ESTIMATED DUE DATE: NORMAL
EXTREME ANALYTE ALERT: NO
FAMILY HISTORY OPEN SPINA BIFIDA: NO
GESTATIONAL  AGE: NORMAL
GESTATIONAL AGE METHOD: NORMAL
GLUCOSE UR-MCNC: NORMAL
HCG UR QL: 0.2 EU/DL
HGB UR QL STRIP.AUTO: NORMAL
INSULIN DEPEND DIABETES: NO
KETONES UR-MCNC: NORMAL
LEUKOCYTE ESTERASE UR QL STRIP: NORMAL
MATERNAL WGT: 303
MULTIPLE PREGNANCY STATUS: NORMAL
NITRITE UR QL STRIP: NORMAL
PH UR STRIP: 5.5
PROT UR STRIP-MCNC: NORMAL
RACE/ETHNICITY: NORMAL
SP GR UR STRIP: 1.02
VALPROIC ACID?: NO

## 2024-07-03 ENCOUNTER — APPOINTMENT (OUTPATIENT)
Dept: ANTEPARTUM | Facility: CLINIC | Age: 24
End: 2024-07-03
Payer: MEDICAID

## 2024-07-03 ENCOUNTER — OUTPATIENT (OUTPATIENT)
Dept: OUTPATIENT SERVICES | Facility: HOSPITAL | Age: 24
LOS: 1 days | End: 2024-07-03
Payer: MEDICAID

## 2024-07-03 ENCOUNTER — ASOB RESULT (OUTPATIENT)
Age: 24
End: 2024-07-03

## 2024-07-03 DIAGNOSIS — Z90.89 ACQUIRED ABSENCE OF OTHER ORGANS: Chronic | ICD-10-CM

## 2024-07-03 DIAGNOSIS — O99.210 OBESITY COMPLICATING PREGNANCY, UNSPECIFIED TRIMESTER: ICD-10-CM

## 2024-07-03 DIAGNOSIS — Z34.90 ENCOUNTER FOR SUPERVISION OF NORMAL PREGNANCY, UNSPECIFIED, UNSPECIFIED TRIMESTER: ICD-10-CM

## 2024-07-03 PROCEDURE — 76811 OB US DETAILED SNGL FETUS: CPT | Mod: 26

## 2024-07-03 PROCEDURE — 76817 TRANSVAGINAL US OBSTETRIC: CPT

## 2024-07-03 PROCEDURE — 76817 TRANSVAGINAL US OBSTETRIC: CPT | Mod: 26

## 2024-07-03 PROCEDURE — 76811 OB US DETAILED SNGL FETUS: CPT

## 2024-07-09 ENCOUNTER — NON-APPOINTMENT (OUTPATIENT)
Age: 24
End: 2024-07-09

## 2024-07-09 ENCOUNTER — APPOINTMENT (OUTPATIENT)
Dept: OBGYN | Facility: CLINIC | Age: 24
End: 2024-07-09
Payer: MEDICAID

## 2024-07-09 VITALS — BODY MASS INDEX: 51.59 KG/M2 | WEIGHT: 293 LBS | SYSTOLIC BLOOD PRESSURE: 116 MMHG | DIASTOLIC BLOOD PRESSURE: 76 MMHG

## 2024-07-09 DIAGNOSIS — Z36.3 ENCOUNTER FOR ANTENATAL SCREENING FOR MALFORMATIONS: ICD-10-CM

## 2024-07-09 DIAGNOSIS — O99.212 OBESITY COMPLICATING PREGNANCY, SECOND TRIMESTER: ICD-10-CM

## 2024-07-09 DIAGNOSIS — Z3A.20 20 WEEKS GESTATION OF PREGNANCY: ICD-10-CM

## 2024-07-09 LAB
BILIRUB UR QL STRIP: NORMAL
GLUCOSE UR-MCNC: NORMAL
HCG UR QL: 0.2 EU/DL
HGB UR QL STRIP.AUTO: NORMAL
KETONES UR-MCNC: NORMAL
LEUKOCYTE ESTERASE UR QL STRIP: NORMAL
NITRITE UR QL STRIP: NORMAL
PH UR STRIP: 6.5
SP GR UR STRIP: 1.02

## 2024-07-09 PROCEDURE — 99213 OFFICE O/P EST LOW 20 MIN: CPT

## 2024-07-15 DIAGNOSIS — Z3A.22 22 WEEKS GESTATION OF PREGNANCY: ICD-10-CM

## 2024-07-16 ENCOUNTER — APPOINTMENT (OUTPATIENT)
Dept: ANTEPARTUM | Facility: CLINIC | Age: 24
End: 2024-07-16
Payer: MEDICAID

## 2024-07-16 ENCOUNTER — APPOINTMENT (OUTPATIENT)
Dept: PEDIATRIC CARDIOLOGY | Facility: CLINIC | Age: 24
End: 2024-07-16

## 2024-07-16 ENCOUNTER — ASOB RESULT (OUTPATIENT)
Age: 24
End: 2024-07-16

## 2024-07-16 ENCOUNTER — OUTPATIENT (OUTPATIENT)
Dept: OUTPATIENT SERVICES | Facility: HOSPITAL | Age: 24
LOS: 1 days | End: 2024-07-16
Payer: MEDICAID

## 2024-07-16 VITALS
HEART RATE: 88 BPM | TEMPERATURE: 98.2 F | DIASTOLIC BLOOD PRESSURE: 75 MMHG | RESPIRATION RATE: 20 BRPM | BODY MASS INDEX: 48.82 KG/M2 | WEIGHT: 293 LBS | SYSTOLIC BLOOD PRESSURE: 115 MMHG | HEIGHT: 65 IN

## 2024-07-16 DIAGNOSIS — Z34.90 ENCOUNTER FOR SUPERVISION OF NORMAL PREGNANCY, UNSPECIFIED, UNSPECIFIED TRIMESTER: ICD-10-CM

## 2024-07-16 DIAGNOSIS — Z90.89 ACQUIRED ABSENCE OF OTHER ORGANS: Chronic | ICD-10-CM

## 2024-07-16 DIAGNOSIS — Z90.3 ACQUIRED ABSENCE OF STOMACH [PART OF]: Chronic | ICD-10-CM

## 2024-07-16 PROCEDURE — 93325 DOPPLER ECHO COLOR FLOW MAPG: CPT

## 2024-07-16 PROCEDURE — 99205 OFFICE O/P NEW HI 60 MIN: CPT | Mod: 25

## 2024-07-16 PROCEDURE — 76816 OB US FOLLOW-UP PER FETUS: CPT | Mod: 26

## 2024-07-16 PROCEDURE — 76827 ECHO EXAM OF FETAL HEART: CPT

## 2024-07-16 PROCEDURE — 76825 ECHO EXAM OF FETAL HEART: CPT

## 2024-07-16 PROCEDURE — 76816 OB US FOLLOW-UP PER FETUS: CPT

## 2024-07-17 DIAGNOSIS — O99.212 OBESITY COMPLICATING PREGNANCY, SECOND TRIMESTER: ICD-10-CM

## 2024-07-17 DIAGNOSIS — Z3A.22 22 WEEKS GESTATION OF PREGNANCY: ICD-10-CM

## 2024-07-17 DIAGNOSIS — Z36.3 ENCOUNTER FOR ANTENATAL SCREENING FOR MALFORMATIONS: ICD-10-CM

## 2024-08-06 ENCOUNTER — APPOINTMENT (OUTPATIENT)
Dept: OBGYN | Facility: CLINIC | Age: 24
End: 2024-08-06

## 2024-08-06 PROBLEM — R39.9 UTI SYMPTOMS: Status: ACTIVE | Noted: 2024-08-06

## 2024-08-06 PROCEDURE — 36415 COLL VENOUS BLD VENIPUNCTURE: CPT

## 2024-08-06 PROCEDURE — 99213 OFFICE O/P EST LOW 20 MIN: CPT

## 2024-08-16 ENCOUNTER — NON-APPOINTMENT (OUTPATIENT)
Age: 24
End: 2024-08-16

## 2024-08-17 ENCOUNTER — EMERGENCY (EMERGENCY)
Facility: HOSPITAL | Age: 24
LOS: 0 days | Discharge: HOME | End: 2024-08-17
Attending: EMERGENCY MEDICINE
Payer: MEDICAID

## 2024-08-17 VITALS
HEIGHT: 65 IN | DIASTOLIC BLOOD PRESSURE: 78 MMHG | OXYGEN SATURATION: 97 % | WEIGHT: 293 LBS | RESPIRATION RATE: 18 BRPM | HEART RATE: 86 BPM | TEMPERATURE: 98 F | SYSTOLIC BLOOD PRESSURE: 127 MMHG

## 2024-08-17 DIAGNOSIS — O99.513 DISEASES OF THE RESPIRATORY SYSTEM COMPLICATING PREGNANCY, THIRD TRIMESTER: ICD-10-CM

## 2024-08-17 DIAGNOSIS — G47.33 OBSTRUCTIVE SLEEP APNEA (ADULT) (PEDIATRIC): ICD-10-CM

## 2024-08-17 DIAGNOSIS — Z90.89 ACQUIRED ABSENCE OF OTHER ORGANS: Chronic | ICD-10-CM

## 2024-08-17 DIAGNOSIS — Z90.3 ACQUIRED ABSENCE OF STOMACH [PART OF]: Chronic | ICD-10-CM

## 2024-08-17 DIAGNOSIS — J45.909 UNSPECIFIED ASTHMA, UNCOMPLICATED: ICD-10-CM

## 2024-08-17 DIAGNOSIS — O21.2 LATE VOMITING OF PREGNANCY: ICD-10-CM

## 2024-08-17 DIAGNOSIS — O99.353 DISEASES OF THE NERVOUS SYSTEM COMPLICATING PREGNANCY, THIRD TRIMESTER: ICD-10-CM

## 2024-08-17 DIAGNOSIS — Z3A.28 28 WEEKS GESTATION OF PREGNANCY: ICD-10-CM

## 2024-08-17 LAB
APPEARANCE UR: CLEAR — SIGNIFICANT CHANGE UP
BILIRUB UR-MCNC: NEGATIVE — SIGNIFICANT CHANGE UP
COLOR SPEC: YELLOW — SIGNIFICANT CHANGE UP
DIFF PNL FLD: NEGATIVE — SIGNIFICANT CHANGE UP
GLUCOSE UR QL: NEGATIVE MG/DL — SIGNIFICANT CHANGE UP
KETONES UR-MCNC: NEGATIVE MG/DL — SIGNIFICANT CHANGE UP
LEUKOCYTE ESTERASE UR-ACNC: NEGATIVE — SIGNIFICANT CHANGE UP
NITRITE UR-MCNC: NEGATIVE — SIGNIFICANT CHANGE UP
PH UR: 6 — SIGNIFICANT CHANGE UP (ref 5–8)
PROT UR-MCNC: SIGNIFICANT CHANGE UP MG/DL
SP GR SPEC: 1.02 — SIGNIFICANT CHANGE UP (ref 1–1.03)
UROBILINOGEN FLD QL: 0.2 MG/DL — SIGNIFICANT CHANGE UP (ref 0.2–1)

## 2024-08-17 PROCEDURE — 99285 EMERGENCY DEPT VISIT HI MDM: CPT

## 2024-08-17 PROCEDURE — 99284 EMERGENCY DEPT VISIT MOD MDM: CPT | Mod: 25

## 2024-08-17 PROCEDURE — 81003 URINALYSIS AUTO W/O SCOPE: CPT

## 2024-08-17 PROCEDURE — 76817 TRANSVAGINAL US OBSTETRIC: CPT

## 2024-08-17 RX ORDER — ONDANSETRON HCL/PF 4 MG/2 ML
1 VIAL (ML) INJECTION
Qty: 12 | Refills: 0
Start: 2024-08-17 | End: 2024-08-19

## 2024-08-19 ENCOUNTER — NON-APPOINTMENT (OUTPATIENT)
Age: 24
End: 2024-08-19

## 2024-08-28 ENCOUNTER — APPOINTMENT (OUTPATIENT)
Dept: ANTEPARTUM | Facility: CLINIC | Age: 24
End: 2024-08-28
Payer: MEDICAID

## 2024-08-28 ENCOUNTER — OUTPATIENT (OUTPATIENT)
Dept: OUTPATIENT SERVICES | Facility: HOSPITAL | Age: 24
LOS: 1 days | End: 2024-08-28
Payer: MEDICAID

## 2024-08-28 ENCOUNTER — ASOB RESULT (OUTPATIENT)
Age: 24
End: 2024-08-28

## 2024-08-28 DIAGNOSIS — Z34.90 ENCOUNTER FOR SUPERVISION OF NORMAL PREGNANCY, UNSPECIFIED, UNSPECIFIED TRIMESTER: ICD-10-CM

## 2024-08-28 DIAGNOSIS — Z90.3 ACQUIRED ABSENCE OF STOMACH [PART OF]: Chronic | ICD-10-CM

## 2024-08-28 DIAGNOSIS — Z90.89 ACQUIRED ABSENCE OF OTHER ORGANS: Chronic | ICD-10-CM

## 2024-08-28 PROCEDURE — 76819 FETAL BIOPHYS PROFIL W/O NST: CPT | Mod: 26,59

## 2024-08-28 PROCEDURE — 76816 OB US FOLLOW-UP PER FETUS: CPT | Mod: 26

## 2024-08-28 PROCEDURE — 76819 FETAL BIOPHYS PROFIL W/O NST: CPT

## 2024-08-28 PROCEDURE — 76816 OB US FOLLOW-UP PER FETUS: CPT

## 2024-08-30 DIAGNOSIS — Z36.2 ENCOUNTER FOR OTHER ANTENATAL SCREENING FOLLOW-UP: ICD-10-CM

## 2024-08-30 DIAGNOSIS — O99.213 OBESITY COMPLICATING PREGNANCY, THIRD TRIMESTER: ICD-10-CM

## 2024-08-30 DIAGNOSIS — Z3A.28 28 WEEKS GESTATION OF PREGNANCY: ICD-10-CM

## 2024-09-03 ENCOUNTER — NON-APPOINTMENT (OUTPATIENT)
Age: 24
End: 2024-09-03

## 2024-09-03 ENCOUNTER — APPOINTMENT (OUTPATIENT)
Dept: OBGYN | Facility: CLINIC | Age: 24
End: 2024-09-03
Payer: MEDICAID

## 2024-09-03 VITALS
WEIGHT: 293 LBS | HEIGHT: 65 IN | DIASTOLIC BLOOD PRESSURE: 68 MMHG | SYSTOLIC BLOOD PRESSURE: 103 MMHG | BODY MASS INDEX: 48.82 KG/M2

## 2024-09-03 DIAGNOSIS — Z34.03 ENCOUNTER FOR SUPERVISION OF NORMAL FIRST PREGNANCY, THIRD TRIMESTER: ICD-10-CM

## 2024-09-03 PROCEDURE — 99213 OFFICE O/P EST LOW 20 MIN: CPT

## 2024-09-03 RX ORDER — PHENOL 1.4 %
325 AEROSOL, SPRAY (ML) MUCOUS MEMBRANE TWICE DAILY
Qty: 90 | Refills: 3 | Status: ACTIVE | COMMUNITY
Start: 2024-09-03 | End: 1900-01-01

## 2024-09-03 RX ORDER — FAMOTIDINE 40 MG/1
40 TABLET, FILM COATED ORAL
Qty: 90 | Refills: 0 | Status: ACTIVE | COMMUNITY
Start: 2024-09-03 | End: 1900-01-01

## 2024-09-27 ENCOUNTER — OUTPATIENT (OUTPATIENT)
Dept: OUTPATIENT SERVICES | Facility: HOSPITAL | Age: 24
LOS: 1 days | End: 2024-09-27
Payer: MEDICAID

## 2024-09-27 ENCOUNTER — ASOB RESULT (OUTPATIENT)
Age: 24
End: 2024-09-27

## 2024-09-27 ENCOUNTER — APPOINTMENT (OUTPATIENT)
Dept: ANTEPARTUM | Facility: CLINIC | Age: 24
End: 2024-09-27
Payer: MEDICAID

## 2024-09-27 DIAGNOSIS — Z03.74 ENCOUNTER FOR SUSPECTED PROBLEM WITH FETAL GROWTH RULED OUT: ICD-10-CM

## 2024-09-27 DIAGNOSIS — Z90.89 ACQUIRED ABSENCE OF OTHER ORGANS: Chronic | ICD-10-CM

## 2024-09-27 DIAGNOSIS — O99.213 OBESITY COMPLICATING PREGNANCY, THIRD TRIMESTER: ICD-10-CM

## 2024-09-27 DIAGNOSIS — Z34.90 ENCOUNTER FOR SUPERVISION OF NORMAL PREGNANCY, UNSPECIFIED, UNSPECIFIED TRIMESTER: ICD-10-CM

## 2024-09-27 DIAGNOSIS — Z3A.33 33 WEEKS GESTATION OF PREGNANCY: ICD-10-CM

## 2024-09-27 DIAGNOSIS — Z90.3 ACQUIRED ABSENCE OF STOMACH [PART OF]: Chronic | ICD-10-CM

## 2024-09-27 PROCEDURE — 76816 OB US FOLLOW-UP PER FETUS: CPT | Mod: 26

## 2024-09-27 PROCEDURE — 76819 FETAL BIOPHYS PROFIL W/O NST: CPT

## 2024-09-27 PROCEDURE — 76816 OB US FOLLOW-UP PER FETUS: CPT

## 2024-09-27 PROCEDURE — 76819 FETAL BIOPHYS PROFIL W/O NST: CPT | Mod: 26,59

## 2024-09-27 NOTE — ED PROVIDER NOTE - WR ORDER NAME 2
Medication: SIMVASTATIN TABS 80MG   Last office visit date: 5/14/24  Medication Refill Protocol Failed.  Failed criteria: see below. Sent to clinician to review.    Request is NOT for Simvastatin 80 mg or Vytorin 10-80 mg    Medication (including dose and sig) on current meds list     
Xray Ankle Complete 3 Views, Left

## 2024-10-01 ENCOUNTER — NON-APPOINTMENT (OUTPATIENT)
Age: 24
End: 2024-10-01

## 2024-10-03 ENCOUNTER — APPOINTMENT (OUTPATIENT)
Dept: OBGYN | Facility: CLINIC | Age: 24
End: 2024-10-03
Payer: MEDICAID

## 2024-10-03 VITALS — WEIGHT: 293 LBS | BODY MASS INDEX: 53.92 KG/M2 | DIASTOLIC BLOOD PRESSURE: 85 MMHG | SYSTOLIC BLOOD PRESSURE: 121 MMHG

## 2024-10-03 DIAGNOSIS — Z34.03 ENCOUNTER FOR SUPERVISION OF NORMAL FIRST PREGNANCY, THIRD TRIMESTER: ICD-10-CM

## 2024-10-03 PROCEDURE — 99213 OFFICE O/P EST LOW 20 MIN: CPT

## 2024-10-04 ENCOUNTER — OUTPATIENT (OUTPATIENT)
Dept: OUTPATIENT SERVICES | Facility: HOSPITAL | Age: 24
LOS: 1 days | End: 2024-10-04
Payer: MEDICAID

## 2024-10-04 ENCOUNTER — APPOINTMENT (OUTPATIENT)
Dept: ANTEPARTUM | Facility: CLINIC | Age: 24
End: 2024-10-04
Payer: MEDICAID

## 2024-10-04 ENCOUNTER — ASOB RESULT (OUTPATIENT)
Age: 24
End: 2024-10-04

## 2024-10-04 DIAGNOSIS — Z34.90 ENCOUNTER FOR SUPERVISION OF NORMAL PREGNANCY, UNSPECIFIED, UNSPECIFIED TRIMESTER: ICD-10-CM

## 2024-10-04 DIAGNOSIS — Z90.3 ACQUIRED ABSENCE OF STOMACH [PART OF]: Chronic | ICD-10-CM

## 2024-10-04 DIAGNOSIS — Z90.89 ACQUIRED ABSENCE OF OTHER ORGANS: Chronic | ICD-10-CM

## 2024-10-04 LAB
BASOPHILS # BLD AUTO: 0.04 K/UL
BASOPHILS NFR BLD AUTO: 0.3 %
EOSINOPHIL # BLD AUTO: 0.09 K/UL
EOSINOPHIL NFR BLD AUTO: 0.7 %
HCT VFR BLD CALC: 30 %
HGB BLD-MCNC: 9.1 G/DL
HIV1+2 AB SPEC QL IA.RAPID: NONREACTIVE
IMM GRANULOCYTES NFR BLD AUTO: 1.1 %
LYMPHOCYTES # BLD AUTO: 1.99 K/UL
LYMPHOCYTES NFR BLD AUTO: 15.7 %
MAN DIFF?: NORMAL
MCHC RBC-ENTMCNC: 23.2 PG
MCHC RBC-ENTMCNC: 30.3 G/DL
MCV RBC AUTO: 76.5 FL
MONOCYTES # BLD AUTO: 0.82 K/UL
MONOCYTES NFR BLD AUTO: 6.5 %
NEUTROPHILS # BLD AUTO: 9.63 K/UL
NEUTROPHILS NFR BLD AUTO: 75.7 %
PLATELET # BLD AUTO: 280 K/UL
PMV BLD AUTO: 0 /100 WBCS
RBC # BLD: 3.92 M/UL
RBC # FLD: 15.6 %
WBC # FLD AUTO: 12.71 K/UL

## 2024-10-04 PROCEDURE — 76819 FETAL BIOPHYS PROFIL W/O NST: CPT | Mod: 26

## 2024-10-04 PROCEDURE — 76819 FETAL BIOPHYS PROFIL W/O NST: CPT

## 2024-10-09 DIAGNOSIS — O99.213 OBESITY COMPLICATING PREGNANCY, THIRD TRIMESTER: ICD-10-CM

## 2024-10-09 DIAGNOSIS — Z3A.34 34 WEEKS GESTATION OF PREGNANCY: ICD-10-CM

## 2024-10-11 ENCOUNTER — APPOINTMENT (OUTPATIENT)
Dept: ANTEPARTUM | Facility: CLINIC | Age: 24
End: 2024-10-11
Payer: MEDICAID

## 2024-10-11 ENCOUNTER — ASOB RESULT (OUTPATIENT)
Age: 24
End: 2024-10-11

## 2024-10-11 ENCOUNTER — OUTPATIENT (OUTPATIENT)
Dept: OUTPATIENT SERVICES | Facility: HOSPITAL | Age: 24
LOS: 1 days | End: 2024-10-11
Payer: MEDICAID

## 2024-10-11 DIAGNOSIS — Z90.3 ACQUIRED ABSENCE OF STOMACH [PART OF]: Chronic | ICD-10-CM

## 2024-10-11 DIAGNOSIS — Z34.90 ENCOUNTER FOR SUPERVISION OF NORMAL PREGNANCY, UNSPECIFIED, UNSPECIFIED TRIMESTER: ICD-10-CM

## 2024-10-11 PROCEDURE — 76819 FETAL BIOPHYS PROFIL W/O NST: CPT | Mod: 26

## 2024-10-11 PROCEDURE — 76819 FETAL BIOPHYS PROFIL W/O NST: CPT

## 2024-10-15 ENCOUNTER — NON-APPOINTMENT (OUTPATIENT)
Age: 24
End: 2024-10-15

## 2024-10-17 ENCOUNTER — APPOINTMENT (OUTPATIENT)
Dept: OBGYN | Facility: CLINIC | Age: 24
End: 2024-10-17
Payer: MEDICAID

## 2024-10-17 VITALS
SYSTOLIC BLOOD PRESSURE: 102 MMHG | BODY MASS INDEX: 48.82 KG/M2 | DIASTOLIC BLOOD PRESSURE: 73 MMHG | WEIGHT: 293 LBS | HEIGHT: 65 IN

## 2024-10-17 DIAGNOSIS — Z3A.35 35 WEEKS GESTATION OF PREGNANCY: ICD-10-CM

## 2024-10-17 DIAGNOSIS — O99.213 OBESITY COMPLICATING PREGNANCY, THIRD TRIMESTER: ICD-10-CM

## 2024-10-17 PROCEDURE — 99213 OFFICE O/P EST LOW 20 MIN: CPT

## 2024-10-18 ENCOUNTER — ASOB RESULT (OUTPATIENT)
Age: 24
End: 2024-10-18

## 2024-10-18 ENCOUNTER — APPOINTMENT (OUTPATIENT)
Dept: ANTEPARTUM | Facility: CLINIC | Age: 24
End: 2024-10-18
Payer: MEDICAID

## 2024-10-18 ENCOUNTER — OUTPATIENT (OUTPATIENT)
Dept: OUTPATIENT SERVICES | Facility: HOSPITAL | Age: 24
LOS: 1 days | End: 2024-10-18
Payer: MEDICAID

## 2024-10-18 DIAGNOSIS — Z90.3 ACQUIRED ABSENCE OF STOMACH [PART OF]: Chronic | ICD-10-CM

## 2024-10-18 DIAGNOSIS — Z34.90 ENCOUNTER FOR SUPERVISION OF NORMAL PREGNANCY, UNSPECIFIED, UNSPECIFIED TRIMESTER: ICD-10-CM

## 2024-10-18 DIAGNOSIS — Z90.89 ACQUIRED ABSENCE OF OTHER ORGANS: Chronic | ICD-10-CM

## 2024-10-18 PROCEDURE — 76819 FETAL BIOPHYS PROFIL W/O NST: CPT | Mod: 26

## 2024-10-18 PROCEDURE — 76819 FETAL BIOPHYS PROFIL W/O NST: CPT

## 2024-10-20 LAB
GP B STREP DNA SPEC QL NAA+PROBE: DETECTED
SOURCE GBS: NORMAL

## 2024-10-21 ENCOUNTER — NON-APPOINTMENT (OUTPATIENT)
Age: 24
End: 2024-10-21

## 2024-10-23 DIAGNOSIS — O99.213 OBESITY COMPLICATING PREGNANCY, THIRD TRIMESTER: ICD-10-CM

## 2024-10-23 DIAGNOSIS — Z3A.36 36 WEEKS GESTATION OF PREGNANCY: ICD-10-CM

## 2024-10-25 ENCOUNTER — OUTPATIENT (OUTPATIENT)
Dept: OUTPATIENT SERVICES | Facility: HOSPITAL | Age: 24
LOS: 1 days | End: 2024-10-25
Payer: MEDICAID

## 2024-10-25 ENCOUNTER — APPOINTMENT (OUTPATIENT)
Dept: OBGYN | Facility: CLINIC | Age: 24
End: 2024-10-25
Payer: MEDICAID

## 2024-10-25 ENCOUNTER — ASOB RESULT (OUTPATIENT)
Age: 24
End: 2024-10-25

## 2024-10-25 ENCOUNTER — APPOINTMENT (OUTPATIENT)
Dept: ANTEPARTUM | Facility: CLINIC | Age: 24
End: 2024-10-25
Payer: MEDICAID

## 2024-10-25 VITALS — SYSTOLIC BLOOD PRESSURE: 105 MMHG | BODY MASS INDEX: 55.75 KG/M2 | WEIGHT: 293 LBS | DIASTOLIC BLOOD PRESSURE: 68 MMHG

## 2024-10-25 DIAGNOSIS — Z34.90 ENCOUNTER FOR SUPERVISION OF NORMAL PREGNANCY, UNSPECIFIED, UNSPECIFIED TRIMESTER: ICD-10-CM

## 2024-10-25 LAB
BILIRUB UR QL STRIP: NORMAL
GLUCOSE UR-MCNC: NORMAL
HCG UR QL: 0.2 EU/DL
HGB UR QL STRIP.AUTO: NORMAL
KETONES UR-MCNC: NORMAL
LEUKOCYTE ESTERASE UR QL STRIP: NORMAL
NITRITE UR QL STRIP: NORMAL
PH UR STRIP: 6.5
PROT UR STRIP-MCNC: NORMAL
SP GR UR STRIP: 1.01

## 2024-10-25 PROCEDURE — 76819 FETAL BIOPHYS PROFIL W/O NST: CPT | Mod: 26,59

## 2024-10-25 PROCEDURE — 76819 FETAL BIOPHYS PROFIL W/O NST: CPT

## 2024-10-25 PROCEDURE — 76816 OB US FOLLOW-UP PER FETUS: CPT

## 2024-10-25 PROCEDURE — 76816 OB US FOLLOW-UP PER FETUS: CPT | Mod: 26

## 2024-10-25 PROCEDURE — 99213 OFFICE O/P EST LOW 20 MIN: CPT

## 2024-10-28 DIAGNOSIS — O99.213 OBESITY COMPLICATING PREGNANCY, THIRD TRIMESTER: ICD-10-CM

## 2024-10-28 DIAGNOSIS — Z3A.37 37 WEEKS GESTATION OF PREGNANCY: ICD-10-CM

## 2024-10-28 DIAGNOSIS — Z03.74 ENCOUNTER FOR SUSPECTED PROBLEM WITH FETAL GROWTH RULED OUT: ICD-10-CM

## 2024-10-29 ENCOUNTER — NON-APPOINTMENT (OUTPATIENT)
Age: 24
End: 2024-10-29

## 2024-10-30 ENCOUNTER — APPOINTMENT (OUTPATIENT)
Dept: OBGYN | Facility: CLINIC | Age: 24
End: 2024-10-30
Payer: MEDICAID

## 2024-10-30 VITALS
HEIGHT: 65 IN | BODY MASS INDEX: 48.82 KG/M2 | SYSTOLIC BLOOD PRESSURE: 125 MMHG | WEIGHT: 293 LBS | DIASTOLIC BLOOD PRESSURE: 70 MMHG

## 2024-10-30 LAB
BILIRUB UR QL STRIP: NORMAL
GLUCOSE UR-MCNC: NORMAL
HCG UR QL: 0.2 EU/DL
HGB UR QL STRIP.AUTO: NORMAL
KETONES UR-MCNC: NORMAL
LEUKOCYTE ESTERASE UR QL STRIP: NORMAL
NITRITE UR QL STRIP: NORMAL
PH UR STRIP: 7
PROT UR STRIP-MCNC: NORMAL
SP GR UR STRIP: 1.02

## 2024-10-30 PROCEDURE — 99213 OFFICE O/P EST LOW 20 MIN: CPT

## 2024-11-01 ENCOUNTER — APPOINTMENT (OUTPATIENT)
Dept: ANTEPARTUM | Facility: CLINIC | Age: 24
End: 2024-11-01
Payer: MEDICAID

## 2024-11-01 ENCOUNTER — ASOB RESULT (OUTPATIENT)
Age: 24
End: 2024-11-01

## 2024-11-01 ENCOUNTER — OUTPATIENT (OUTPATIENT)
Dept: OUTPATIENT SERVICES | Facility: HOSPITAL | Age: 24
LOS: 1 days | End: 2024-11-01
Payer: MEDICAID

## 2024-11-01 DIAGNOSIS — Z34.90 ENCOUNTER FOR SUPERVISION OF NORMAL PREGNANCY, UNSPECIFIED, UNSPECIFIED TRIMESTER: ICD-10-CM

## 2024-11-01 DIAGNOSIS — Z90.3 ACQUIRED ABSENCE OF STOMACH [PART OF]: Chronic | ICD-10-CM

## 2024-11-01 DIAGNOSIS — O99.213 OBESITY COMPLICATING PREGNANCY, THIRD TRIMESTER: ICD-10-CM

## 2024-11-01 DIAGNOSIS — Z90.89 ACQUIRED ABSENCE OF OTHER ORGANS: Chronic | ICD-10-CM

## 2024-11-01 DIAGNOSIS — Z3A.38 38 WEEKS GESTATION OF PREGNANCY: ICD-10-CM

## 2024-11-01 PROCEDURE — 76819 FETAL BIOPHYS PROFIL W/O NST: CPT

## 2024-11-01 PROCEDURE — 76819 FETAL BIOPHYS PROFIL W/O NST: CPT | Mod: 26

## 2024-11-05 ENCOUNTER — NON-APPOINTMENT (OUTPATIENT)
Age: 24
End: 2024-11-05

## 2024-11-07 ENCOUNTER — APPOINTMENT (OUTPATIENT)
Dept: OBGYN | Facility: CLINIC | Age: 24
End: 2024-11-07
Payer: MEDICAID

## 2024-11-07 VITALS — DIASTOLIC BLOOD PRESSURE: 68 MMHG | BODY MASS INDEX: 55.75 KG/M2 | SYSTOLIC BLOOD PRESSURE: 111 MMHG | WEIGHT: 293 LBS

## 2024-11-07 LAB
BILIRUB UR QL STRIP: NORMAL
GLUCOSE UR-MCNC: NORMAL
HCG UR QL: 0.2 EU/DL
HGB UR QL STRIP.AUTO: NORMAL
KETONES UR-MCNC: NORMAL
LEUKOCYTE ESTERASE UR QL STRIP: NORMAL
NITRITE UR QL STRIP: NORMAL
PH UR STRIP: 5.5
PROT UR STRIP-MCNC: NORMAL
SP GR UR STRIP: 1.02

## 2024-11-07 PROCEDURE — 99213 OFFICE O/P EST LOW 20 MIN: CPT

## 2024-11-08 ENCOUNTER — APPOINTMENT (OUTPATIENT)
Dept: ANTEPARTUM | Facility: CLINIC | Age: 24
End: 2024-11-08

## 2024-11-11 ENCOUNTER — APPOINTMENT (OUTPATIENT)
Dept: OBGYN | Facility: CLINIC | Age: 24
End: 2024-11-11
Payer: MEDICAID

## 2024-11-11 VITALS — DIASTOLIC BLOOD PRESSURE: 75 MMHG | BODY MASS INDEX: 55.75 KG/M2 | WEIGHT: 293 LBS | SYSTOLIC BLOOD PRESSURE: 109 MMHG

## 2024-11-11 PROCEDURE — 99213 OFFICE O/P EST LOW 20 MIN: CPT

## 2024-11-13 ENCOUNTER — INPATIENT (INPATIENT)
Facility: HOSPITAL | Age: 24
LOS: 2 days | Discharge: HOME | DRG: 566 | End: 2024-11-16
Attending: OBSTETRICS & GYNECOLOGY | Admitting: OBSTETRICS & GYNECOLOGY
Payer: MEDICAID

## 2024-11-13 ENCOUNTER — NON-APPOINTMENT (OUTPATIENT)
Age: 24
End: 2024-11-13

## 2024-11-13 VITALS
SYSTOLIC BLOOD PRESSURE: 122 MMHG | RESPIRATION RATE: 20 BRPM | HEART RATE: 90 BPM | TEMPERATURE: 98 F | DIASTOLIC BLOOD PRESSURE: 68 MMHG

## 2024-11-13 DIAGNOSIS — Z90.3 ACQUIRED ABSENCE OF STOMACH [PART OF]: Chronic | ICD-10-CM

## 2024-11-13 DIAGNOSIS — O42.92 FULL-TERM PREMATURE RUPTURE OF MEMBRANES, UNSPECIFIED AS TO LENGTH OF TIME BETWEEN RUPTURE AND ONSET OF LABOR: ICD-10-CM

## 2024-11-13 DIAGNOSIS — O26.899 OTHER SPECIFIED PREGNANCY RELATED CONDITIONS, UNSPECIFIED TRIMESTER: ICD-10-CM

## 2024-11-13 DIAGNOSIS — Z90.89 ACQUIRED ABSENCE OF OTHER ORGANS: Chronic | ICD-10-CM

## 2024-11-13 LAB
APPEARANCE UR: CLEAR — SIGNIFICANT CHANGE UP
BASOPHILS # BLD AUTO: 0.05 K/UL — SIGNIFICANT CHANGE UP (ref 0–0.2)
BASOPHILS NFR BLD AUTO: 0.3 % — SIGNIFICANT CHANGE UP (ref 0–1)
BILIRUB UR-MCNC: NEGATIVE — SIGNIFICANT CHANGE UP
COLOR SPEC: YELLOW — SIGNIFICANT CHANGE UP
DIFF PNL FLD: NEGATIVE — SIGNIFICANT CHANGE UP
EOSINOPHIL # BLD AUTO: 0.13 K/UL — SIGNIFICANT CHANGE UP (ref 0–0.7)
EOSINOPHIL NFR BLD AUTO: 0.8 % — SIGNIFICANT CHANGE UP (ref 0–8)
GLUCOSE UR QL: NEGATIVE MG/DL — SIGNIFICANT CHANGE UP
HCT VFR BLD CALC: 29.7 % — LOW (ref 37–47)
HGB BLD-MCNC: 8.8 G/DL — LOW (ref 12–16)
IMM GRANULOCYTES NFR BLD AUTO: 1.6 % — HIGH (ref 0.1–0.3)
KETONES UR-MCNC: NEGATIVE MG/DL — SIGNIFICANT CHANGE UP
L&D DRUG SCREEN, URINE: SIGNIFICANT CHANGE UP
LEUKOCYTE ESTERASE UR-ACNC: NEGATIVE — SIGNIFICANT CHANGE UP
LYMPHOCYTES # BLD AUTO: 16.9 % — LOW (ref 20.5–51.1)
LYMPHOCYTES # BLD AUTO: 2.68 K/UL — SIGNIFICANT CHANGE UP (ref 1.2–3.4)
MCHC RBC-ENTMCNC: 21.6 PG — LOW (ref 27–31)
MCHC RBC-ENTMCNC: 29.6 G/DL — LOW (ref 32–37)
MCV RBC AUTO: 72.8 FL — LOW (ref 81–99)
MONOCYTES # BLD AUTO: 1.06 K/UL — HIGH (ref 0.1–0.6)
MONOCYTES NFR BLD AUTO: 6.7 % — SIGNIFICANT CHANGE UP (ref 1.7–9.3)
NEUTROPHILS # BLD AUTO: 11.67 K/UL — HIGH (ref 1.4–6.5)
NEUTROPHILS NFR BLD AUTO: 73.7 % — SIGNIFICANT CHANGE UP (ref 42.2–75.2)
NITRITE UR-MCNC: NEGATIVE — SIGNIFICANT CHANGE UP
NRBC # BLD: 0 /100 WBCS — SIGNIFICANT CHANGE UP (ref 0–0)
NRBC BLD-RTO: 0 /100 WBCS — SIGNIFICANT CHANGE UP (ref 0–0)
PH UR: 5.5 — SIGNIFICANT CHANGE UP (ref 5–8)
PLATELET # BLD AUTO: 255 K/UL — SIGNIFICANT CHANGE UP (ref 130–400)
PMV BLD: 11.9 FL — HIGH (ref 7.4–10.4)
PRENATAL SYPHILIS TEST: SIGNIFICANT CHANGE UP
PROT UR-MCNC: SIGNIFICANT CHANGE UP MG/DL
RBC # BLD: 4.08 M/UL — LOW (ref 4.2–5.4)
RBC # FLD: 16.6 % — HIGH (ref 11.5–14.5)
SP GR SPEC: 1.02 — SIGNIFICANT CHANGE UP (ref 1–1.03)
UROBILINOGEN FLD QL: 0.2 MG/DL — SIGNIFICANT CHANGE UP (ref 0.2–1)
WBC # BLD: 15.85 K/UL — HIGH (ref 4.8–10.8)
WBC # FLD AUTO: 15.85 K/UL — HIGH (ref 4.8–10.8)

## 2024-11-13 PROCEDURE — 86850 RBC ANTIBODY SCREEN: CPT

## 2024-11-13 PROCEDURE — 59025 FETAL NON-STRESS TEST: CPT

## 2024-11-13 PROCEDURE — 85025 COMPLETE CBC W/AUTO DIFF WBC: CPT

## 2024-11-13 PROCEDURE — 80354 DRUG SCREENING FENTANYL: CPT

## 2024-11-13 PROCEDURE — 86900 BLOOD TYPING SEROLOGIC ABO: CPT

## 2024-11-13 PROCEDURE — 80307 DRUG TEST PRSMV CHEM ANLYZR: CPT

## 2024-11-13 PROCEDURE — 86592 SYPHILIS TEST NON-TREP QUAL: CPT

## 2024-11-13 PROCEDURE — 86901 BLOOD TYPING SEROLOGIC RH(D): CPT

## 2024-11-13 PROCEDURE — 81003 URINALYSIS AUTO W/O SCOPE: CPT

## 2024-11-13 PROCEDURE — 36415 COLL VENOUS BLD VENIPUNCTURE: CPT

## 2024-11-13 PROCEDURE — 59514 CESAREAN DELIVERY ONLY: CPT | Mod: U9

## 2024-11-13 RX ORDER — AMPICILLIN SODIUM 1 G/1
2 INJECTION, POWDER, FOR SOLUTION INTRAMUSCULAR; INTRAVENOUS ONCE
Refills: 0 | Status: COMPLETED | OUTPATIENT
Start: 2024-11-13 | End: 2024-11-13

## 2024-11-13 RX ORDER — MAGNESIUM HYDROXIDE 400 MG/5ML
30 SUSPENSION ORAL
Refills: 0 | Status: DISCONTINUED | OUTPATIENT
Start: 2024-11-13 | End: 2024-11-16

## 2024-11-13 RX ORDER — SODIUM CHLORIDE 9 G/1000ML
1000 INJECTION, SOLUTION INTRAVENOUS
Refills: 0 | Status: DISCONTINUED | OUTPATIENT
Start: 2024-11-13 | End: 2024-11-16

## 2024-11-13 RX ORDER — ENOXAPARIN SODIUM 100 MG/ML
40 INJECTION SUBCUTANEOUS EVERY 24 HOURS
Refills: 0 | Status: DISCONTINUED | OUTPATIENT
Start: 2024-11-14 | End: 2024-11-16

## 2024-11-13 RX ORDER — SODIUM CHLORIDE 9 G/1000ML
1000 INJECTION, SOLUTION INTRAVENOUS
Refills: 0 | Status: DISCONTINUED | OUTPATIENT
Start: 2024-11-13 | End: 2024-11-13

## 2024-11-13 RX ORDER — CEFAZOLIN SODIUM IN 0.9 % NACL 3 G/100 ML
3000 INTRAVENOUS SOLUTION, PIGGYBACK (ML) INTRAVENOUS ONCE
Refills: 0 | Status: COMPLETED | OUTPATIENT
Start: 2024-11-13 | End: 2024-11-13

## 2024-11-13 RX ORDER — AZITHROMYCIN 250 MG
500 CAPSULE ORAL ONCE
Refills: 0 | Status: DISCONTINUED | OUTPATIENT
Start: 2024-11-13 | End: 2024-11-13

## 2024-11-13 RX ORDER — OXYTOCIN-SODIUM CHLORIDE 0.9% IV SOLN 30 UNIT/500ML 30-0.9/5 UT/ML-%
42 SOLUTION INTRAVENOUS
Qty: 30 | Refills: 0 | Status: DISCONTINUED | OUTPATIENT
Start: 2024-11-13 | End: 2024-11-16

## 2024-11-13 RX ORDER — DIPHENHYDRAMINE HCL 12.5MG/5ML
25 ELIXIR ORAL EVERY 6 HOURS
Refills: 0 | Status: COMPLETED | OUTPATIENT
Start: 2024-11-13 | End: 2025-10-12

## 2024-11-13 RX ORDER — OXYTOCIN-SODIUM CHLORIDE 0.9% IV SOLN 30 UNIT/500ML 30-0.9/5 UT/ML-%
2 SOLUTION INTRAVENOUS
Qty: 30 | Refills: 0 | Status: DISCONTINUED | OUTPATIENT
Start: 2024-11-13 | End: 2024-11-13

## 2024-11-13 RX ORDER — INFLUENZA A VIRUS A/IDAHO/07/2018 (H1N1) ANTIGEN (MDCK CELL DERIVED, PROPIOLACTONE INACTIVATED, INFLUENZA A VIRUS A/INDIANA/08/2018 (H3N2) ANTIGEN (MDCK CELL DERIVED, PROPIOLACTONE INACTIVATED), INFLUENZA B VIRUS B/SINGAPORE/INFTT-16-0610/2016 ANTIGEN (MDCK CELL DERIVED, PROPIOLACTONE INACTIVATED), INFLUENZA B VIRUS B/IOWA/06/2017 ANTIGEN (MDCK CELL DERIVED, PROPIOLACTONE INACTIVATED) 15; 15; 15; 15 UG/.5ML; UG/.5ML; UG/.5ML; UG/.5ML
0.5 INJECTION, SUSPENSION INTRAMUSCULAR ONCE
Refills: 0 | Status: COMPLETED | OUTPATIENT
Start: 2024-11-13 | End: 2024-11-13

## 2024-11-13 RX ORDER — OXYTOCIN-SODIUM CHLORIDE 0.9% IV SOLN 30 UNIT/500ML 30-0.9/5 UT/ML-%
167 SOLUTION INTRAVENOUS
Qty: 30 | Refills: 0 | Status: DISCONTINUED | OUTPATIENT
Start: 2024-11-13 | End: 2024-11-13

## 2024-11-13 RX ORDER — MODIFIED LANOLIN 100 %
1 CREAM (GRAM) TOPICAL EVERY 6 HOURS
Refills: 0 | Status: DISCONTINUED | OUTPATIENT
Start: 2024-11-13 | End: 2024-11-16

## 2024-11-13 RX ORDER — OXYCODONE HYDROCHLORIDE 30 MG/1
5 TABLET ORAL ONCE
Refills: 0 | Status: DISCONTINUED | OUTPATIENT
Start: 2024-11-13 | End: 2024-11-16

## 2024-11-13 RX ORDER — ACETAMINOPHEN 500 MG/5ML
975 LIQUID (ML) ORAL
Refills: 0 | Status: DISCONTINUED | OUTPATIENT
Start: 2024-11-13 | End: 2024-11-16

## 2024-11-13 RX ORDER — OXYCODONE HYDROCHLORIDE 30 MG/1
5 TABLET ORAL
Refills: 0 | Status: COMPLETED | OUTPATIENT
Start: 2024-11-13 | End: 2024-11-20

## 2024-11-13 RX ORDER — AMPICILLIN SODIUM 1 G/1
1 INJECTION, POWDER, FOR SOLUTION INTRAMUSCULAR; INTRAVENOUS EVERY 4 HOURS
Refills: 0 | Status: DISCONTINUED | OUTPATIENT
Start: 2024-11-13 | End: 2024-11-13

## 2024-11-13 RX ORDER — SIMETHICONE 80 MG
80 TABLET,CHEWABLE ORAL EVERY 4 HOURS
Refills: 0 | Status: DISCONTINUED | OUTPATIENT
Start: 2024-11-13 | End: 2024-11-16

## 2024-11-13 RX ORDER — IBUPROFEN 200 MG
600 TABLET ORAL EVERY 6 HOURS
Refills: 0 | Status: DISCONTINUED | OUTPATIENT
Start: 2024-11-13 | End: 2024-11-13

## 2024-11-13 RX ADMIN — AMPICILLIN SODIUM 108 GRAM(S): 1 INJECTION, POWDER, FOR SOLUTION INTRAMUSCULAR; INTRAVENOUS at 14:30

## 2024-11-13 RX ADMIN — SODIUM CHLORIDE 125 MILLILITER(S): 9 INJECTION, SOLUTION INTRAVENOUS at 07:19

## 2024-11-13 RX ADMIN — Medication 200 MILLIGRAM(S): at 20:50

## 2024-11-13 RX ADMIN — AMPICILLIN SODIUM 108 GRAM(S): 1 INJECTION, POWDER, FOR SOLUTION INTRAMUSCULAR; INTRAVENOUS at 10:30

## 2024-11-13 RX ADMIN — AMPICILLIN SODIUM 108 GRAM(S): 1 INJECTION, POWDER, FOR SOLUTION INTRAMUSCULAR; INTRAVENOUS at 18:29

## 2024-11-13 RX ADMIN — AMPICILLIN SODIUM 200 GRAM(S): 1 INJECTION, POWDER, FOR SOLUTION INTRAMUSCULAR; INTRAVENOUS at 06:25

## 2024-11-13 RX ADMIN — OXYTOCIN-SODIUM CHLORIDE 0.9% IV SOLN 30 UNIT/500ML 2 MILLIUNIT(S)/MIN: 30-0.9/5 SOLUTION at 08:44

## 2024-11-14 LAB
BASOPHILS # BLD AUTO: 0.03 K/UL — SIGNIFICANT CHANGE UP (ref 0–0.2)
BASOPHILS NFR BLD AUTO: 0.2 % — SIGNIFICANT CHANGE UP (ref 0–1)
EOSINOPHIL # BLD AUTO: 0.07 K/UL — SIGNIFICANT CHANGE UP (ref 0–0.7)
EOSINOPHIL NFR BLD AUTO: 0.5 % — SIGNIFICANT CHANGE UP (ref 0–8)
HCT VFR BLD CALC: 21.5 % — LOW (ref 37–47)
HGB BLD-MCNC: 6.5 G/DL — CRITICAL LOW (ref 12–16)
IMM GRANULOCYTES NFR BLD AUTO: 0.9 % — HIGH (ref 0.1–0.3)
LYMPHOCYTES # BLD AUTO: 1.38 K/UL — SIGNIFICANT CHANGE UP (ref 1.2–3.4)
LYMPHOCYTES # BLD AUTO: 9.4 % — LOW (ref 20.5–51.1)
MCHC RBC-ENTMCNC: 22.2 PG — LOW (ref 27–31)
MCHC RBC-ENTMCNC: 30.2 G/DL — LOW (ref 32–37)
MCV RBC AUTO: 73.4 FL — LOW (ref 81–99)
MONOCYTES # BLD AUTO: 1 K/UL — HIGH (ref 0.1–0.6)
MONOCYTES NFR BLD AUTO: 6.8 % — SIGNIFICANT CHANGE UP (ref 1.7–9.3)
NEUTROPHILS # BLD AUTO: 12.04 K/UL — HIGH (ref 1.4–6.5)
NEUTROPHILS NFR BLD AUTO: 82.2 % — HIGH (ref 42.2–75.2)
NRBC # BLD: 0 /100 WBCS — SIGNIFICANT CHANGE UP (ref 0–0)
NRBC BLD-RTO: 0 /100 WBCS — SIGNIFICANT CHANGE UP (ref 0–0)
PLATELET # BLD AUTO: 177 K/UL — SIGNIFICANT CHANGE UP (ref 130–400)
PMV BLD: 12.3 FL — HIGH (ref 7.4–10.4)
RBC # BLD: 2.93 M/UL — LOW (ref 4.2–5.4)
RBC # FLD: 16.7 % — HIGH (ref 11.5–14.5)
WBC # BLD: 14.65 K/UL — HIGH (ref 4.8–10.8)
WBC # FLD AUTO: 14.65 K/UL — HIGH (ref 4.8–10.8)

## 2024-11-14 PROCEDURE — 99231 SBSQ HOSP IP/OBS SF/LOW 25: CPT

## 2024-11-14 RX ORDER — FERROUS SULFATE 137(45) MG
325 TABLET, EXTENDED RELEASE ORAL DAILY
Refills: 0 | Status: DISCONTINUED | OUTPATIENT
Start: 2024-11-14 | End: 2024-11-16

## 2024-11-14 RX ORDER — DEXAMETHASONE 0.5 MG/1
4 TABLET ORAL EVERY 6 HOURS
Refills: 0 | Status: DISCONTINUED | OUTPATIENT
Start: 2024-11-14 | End: 2024-11-16

## 2024-11-14 RX ORDER — IRON SUCROSE 20 MG/ML
200 INJECTION, SOLUTION INTRAVENOUS ONCE
Refills: 0 | Status: COMPLETED | OUTPATIENT
Start: 2024-11-14 | End: 2024-11-14

## 2024-11-14 RX ORDER — ACETAMINOPHEN 500 MG/5ML
1000 LIQUID (ML) ORAL ONCE
Refills: 0 | Status: COMPLETED | OUTPATIENT
Start: 2024-11-14 | End: 2024-11-14

## 2024-11-14 RX ORDER — ACETAMINOPHEN 500 MG/5ML
975 LIQUID (ML) ORAL EVERY 6 HOURS
Refills: 0 | Status: DISCONTINUED | OUTPATIENT
Start: 2024-11-14 | End: 2024-11-16

## 2024-11-14 RX ORDER — DIPHENHYDRAMINE HCL 12.5MG/5ML
25 ELIXIR ORAL EVERY 6 HOURS
Refills: 0 | Status: DISCONTINUED | OUTPATIENT
Start: 2024-11-14 | End: 2024-11-16

## 2024-11-14 RX ORDER — NALOXONE HYDROCHLORIDE 0.4 MG/ML
0.1 INJECTION, SOLUTION INTRAMUSCULAR; INTRAVENOUS; SUBCUTANEOUS
Refills: 0 | Status: DISCONTINUED | OUTPATIENT
Start: 2024-11-14 | End: 2024-11-16

## 2024-11-14 RX ORDER — OXYCODONE HYDROCHLORIDE 30 MG/1
5 TABLET ORAL
Refills: 0 | Status: DISCONTINUED | OUTPATIENT
Start: 2024-11-14 | End: 2024-11-16

## 2024-11-14 RX ORDER — ONDANSETRON HCL/PF 4 MG/2 ML
4 VIAL (ML) INJECTION EVERY 6 HOURS
Refills: 0 | Status: DISCONTINUED | OUTPATIENT
Start: 2024-11-14 | End: 2024-11-16

## 2024-11-14 RX ADMIN — Medication 975 MILLIGRAM(S): at 08:49

## 2024-11-14 RX ADMIN — Medication 975 MILLIGRAM(S): at 16:49

## 2024-11-14 RX ADMIN — Medication 80 MILLIGRAM(S): at 11:30

## 2024-11-14 RX ADMIN — Medication 25 MILLIGRAM(S): at 04:17

## 2024-11-14 RX ADMIN — Medication 80 MILLIGRAM(S): at 06:25

## 2024-11-14 RX ADMIN — Medication 975 MILLIGRAM(S): at 20:10

## 2024-11-14 RX ADMIN — ENOXAPARIN SODIUM 40 MILLIGRAM(S): 100 INJECTION SUBCUTANEOUS at 12:49

## 2024-11-14 RX ADMIN — IRON SUCROSE 100 MILLIGRAM(S): 20 INJECTION, SOLUTION INTRAVENOUS at 16:17

## 2024-11-14 RX ADMIN — Medication 975 MILLIGRAM(S): at 04:16

## 2024-11-14 RX ADMIN — Medication 975 MILLIGRAM(S): at 09:15

## 2024-11-14 RX ADMIN — Medication 975 MILLIGRAM(S): at 16:18

## 2024-11-14 RX ADMIN — Medication 325 MILLIGRAM(S): at 12:49

## 2024-11-14 RX ADMIN — OXYCODONE HYDROCHLORIDE 5 MILLIGRAM(S): 30 TABLET ORAL at 23:54

## 2024-11-14 RX ADMIN — MAGNESIUM HYDROXIDE 30 MILLILITER(S): 400 SUSPENSION ORAL at 20:09

## 2024-11-14 RX ADMIN — Medication 400 MILLIGRAM(S): at 00:50

## 2024-11-15 ENCOUNTER — APPOINTMENT (OUTPATIENT)
Dept: ANTEPARTUM | Facility: CLINIC | Age: 24
End: 2024-11-15

## 2024-11-15 ENCOUNTER — TRANSCRIPTION ENCOUNTER (OUTPATIENT)
Age: 24
End: 2024-11-15

## 2024-11-15 LAB
BASOPHILS # BLD AUTO: 0.06 K/UL — SIGNIFICANT CHANGE UP (ref 0–0.2)
BASOPHILS NFR BLD AUTO: 0.3 % — SIGNIFICANT CHANGE UP (ref 0–1)
EOSINOPHIL # BLD AUTO: 0.12 K/UL — SIGNIFICANT CHANGE UP (ref 0–0.7)
EOSINOPHIL NFR BLD AUTO: 0.7 % — SIGNIFICANT CHANGE UP (ref 0–8)
HCT VFR BLD CALC: 24.9 % — LOW (ref 37–47)
HGB BLD-MCNC: 7.4 G/DL — LOW (ref 12–16)
IMM GRANULOCYTES NFR BLD AUTO: 1.3 % — HIGH (ref 0.1–0.3)
LYMPHOCYTES # BLD AUTO: 12.4 % — LOW (ref 20.5–51.1)
LYMPHOCYTES # BLD AUTO: 2.18 K/UL — SIGNIFICANT CHANGE UP (ref 1.2–3.4)
MCHC RBC-ENTMCNC: 22 PG — LOW (ref 27–31)
MCHC RBC-ENTMCNC: 29.7 G/DL — LOW (ref 32–37)
MCV RBC AUTO: 74.1 FL — LOW (ref 81–99)
MONOCYTES # BLD AUTO: 0.95 K/UL — HIGH (ref 0.1–0.6)
MONOCYTES NFR BLD AUTO: 5.4 % — SIGNIFICANT CHANGE UP (ref 1.7–9.3)
NEUTROPHILS # BLD AUTO: 13.99 K/UL — HIGH (ref 1.4–6.5)
NEUTROPHILS NFR BLD AUTO: 79.9 % — HIGH (ref 42.2–75.2)
NRBC # BLD: 0 /100 WBCS — SIGNIFICANT CHANGE UP (ref 0–0)
NRBC BLD-RTO: 0 /100 WBCS — SIGNIFICANT CHANGE UP (ref 0–0)
PLATELET # BLD AUTO: 227 K/UL — SIGNIFICANT CHANGE UP (ref 130–400)
PMV BLD: 12.6 FL — HIGH (ref 7.4–10.4)
RBC # BLD: 3.36 M/UL — LOW (ref 4.2–5.4)
RBC # FLD: 17.2 % — HIGH (ref 11.5–14.5)
WBC # BLD: 17.52 K/UL — HIGH (ref 4.8–10.8)
WBC # FLD AUTO: 17.52 K/UL — HIGH (ref 4.8–10.8)

## 2024-11-15 PROCEDURE — 99238 HOSP IP/OBS DSCHRG MGMT 30/<: CPT

## 2024-11-15 RX ORDER — POLYETHYLENE GLYCOL 3350 17 G/17G
17 POWDER, FOR SOLUTION ORAL EVERY 12 HOURS
Refills: 0 | Status: DISCONTINUED | OUTPATIENT
Start: 2024-11-15 | End: 2024-11-16

## 2024-11-15 RX ADMIN — OXYCODONE HYDROCHLORIDE 5 MILLIGRAM(S): 30 TABLET ORAL at 11:40

## 2024-11-15 RX ADMIN — Medication 975 MILLIGRAM(S): at 16:28

## 2024-11-15 RX ADMIN — Medication 975 MILLIGRAM(S): at 20:23

## 2024-11-15 RX ADMIN — Medication 975 MILLIGRAM(S): at 02:51

## 2024-11-15 RX ADMIN — POLYETHYLENE GLYCOL 3350 17 GRAM(S): 17 POWDER, FOR SOLUTION ORAL at 08:12

## 2024-11-15 RX ADMIN — Medication 325 MILLIGRAM(S): at 12:32

## 2024-11-15 RX ADMIN — Medication 975 MILLIGRAM(S): at 15:28

## 2024-11-15 RX ADMIN — Medication 975 MILLIGRAM(S): at 09:36

## 2024-11-15 RX ADMIN — ENOXAPARIN SODIUM 40 MILLIGRAM(S): 100 INJECTION SUBCUTANEOUS at 10:28

## 2024-11-15 RX ADMIN — OXYCODONE HYDROCHLORIDE 5 MILLIGRAM(S): 30 TABLET ORAL at 03:07

## 2024-11-15 RX ADMIN — OXYCODONE HYDROCHLORIDE 5 MILLIGRAM(S): 30 TABLET ORAL at 16:53

## 2024-11-15 RX ADMIN — OXYCODONE HYDROCHLORIDE 5 MILLIGRAM(S): 30 TABLET ORAL at 10:27

## 2024-11-15 RX ADMIN — Medication 975 MILLIGRAM(S): at 08:06

## 2024-11-15 RX ADMIN — POLYETHYLENE GLYCOL 3350 17 GRAM(S): 17 POWDER, FOR SOLUTION ORAL at 20:23

## 2024-11-15 RX ADMIN — Medication 975 MILLIGRAM(S): at 21:00

## 2024-11-15 RX ADMIN — Medication 80 MILLIGRAM(S): at 05:48

## 2024-11-16 VITALS
RESPIRATION RATE: 18 BRPM | SYSTOLIC BLOOD PRESSURE: 113 MMHG | DIASTOLIC BLOOD PRESSURE: 80 MMHG | OXYGEN SATURATION: 98 % | HEART RATE: 86 BPM | TEMPERATURE: 98 F

## 2024-11-16 RX ORDER — OXYCODONE HYDROCHLORIDE 30 MG/1
1 TABLET ORAL
Qty: 6 | Refills: 0
Start: 2024-11-16

## 2024-11-16 RX ADMIN — OXYCODONE HYDROCHLORIDE 5 MILLIGRAM(S): 30 TABLET ORAL at 00:12

## 2024-11-16 RX ADMIN — Medication 975 MILLIGRAM(S): at 03:20

## 2024-11-16 RX ADMIN — OXYCODONE HYDROCHLORIDE 5 MILLIGRAM(S): 30 TABLET ORAL at 06:18

## 2024-11-16 RX ADMIN — OXYCODONE HYDROCHLORIDE 5 MILLIGRAM(S): 30 TABLET ORAL at 05:48

## 2024-11-16 RX ADMIN — Medication 975 MILLIGRAM(S): at 02:48

## 2024-11-16 RX ADMIN — OXYCODONE HYDROCHLORIDE 5 MILLIGRAM(S): 30 TABLET ORAL at 09:36

## 2024-11-16 RX ADMIN — OXYCODONE HYDROCHLORIDE 5 MILLIGRAM(S): 30 TABLET ORAL at 00:56

## 2024-11-16 RX ADMIN — Medication 80 MILLIGRAM(S): at 00:12

## 2024-11-16 RX ADMIN — POLYETHYLENE GLYCOL 3350 17 GRAM(S): 17 POWDER, FOR SOLUTION ORAL at 09:37

## 2024-11-20 ENCOUNTER — APPOINTMENT (OUTPATIENT)
Dept: OBGYN | Facility: CLINIC | Age: 24
End: 2024-11-20
Payer: MEDICAID

## 2024-11-20 ENCOUNTER — NON-APPOINTMENT (OUTPATIENT)
Age: 24
End: 2024-11-20

## 2024-11-20 PROCEDURE — 99024 POSTOP FOLLOW-UP VISIT: CPT

## 2024-11-21 DIAGNOSIS — E66.01 MORBID (SEVERE) OBESITY DUE TO EXCESS CALORIES: ICD-10-CM

## 2024-11-21 DIAGNOSIS — G47.33 OBSTRUCTIVE SLEEP APNEA (ADULT) (PEDIATRIC): ICD-10-CM

## 2024-11-21 DIAGNOSIS — Z3A.39 39 WEEKS GESTATION OF PREGNANCY: ICD-10-CM

## 2024-11-21 DIAGNOSIS — J45.909 UNSPECIFIED ASTHMA, UNCOMPLICATED: ICD-10-CM

## 2024-11-21 DIAGNOSIS — D64.9 ANEMIA, UNSPECIFIED: ICD-10-CM

## 2024-11-22 ENCOUNTER — APPOINTMENT (OUTPATIENT)
Dept: ANTEPARTUM | Facility: CLINIC | Age: 24
End: 2024-11-22

## 2024-12-03 NOTE — PRE-ANESTHESIA EVALUATION ADULT - NSATTENDATTESTRD_GEN_ALL_CORE
16 The patient has been re-examined and I agree with the above assessment or I updated with my findings.

## 2025-01-10 ENCOUNTER — APPOINTMENT (OUTPATIENT)
Dept: OBGYN | Facility: CLINIC | Age: 25
End: 2025-01-10

## 2025-05-07 DIAGNOSIS — E66.813 OBESITY, CLASS 3: ICD-10-CM

## 2025-05-09 ENCOUNTER — APPOINTMENT (OUTPATIENT)
Dept: SURGERY | Facility: CLINIC | Age: 25
End: 2025-05-09
Payer: MEDICAID

## 2025-05-09 VITALS
DIASTOLIC BLOOD PRESSURE: 72 MMHG | WEIGHT: 293 LBS | HEART RATE: 94 BPM | HEIGHT: 65 IN | OXYGEN SATURATION: 98 % | BODY MASS INDEX: 48.82 KG/M2 | SYSTOLIC BLOOD PRESSURE: 106 MMHG

## 2025-05-09 DIAGNOSIS — K21.9 GASTRO-ESOPHAGEAL REFLUX DISEASE W/OUT ESOPHAGITIS: ICD-10-CM

## 2025-05-09 PROCEDURE — 99214 OFFICE O/P EST MOD 30 MIN: CPT

## 2025-05-23 ENCOUNTER — NON-APPOINTMENT (OUTPATIENT)
Age: 25
End: 2025-05-23

## 2025-06-02 ENCOUNTER — APPOINTMENT (OUTPATIENT)
Dept: PSYCHIATRY | Facility: CLINIC | Age: 25
End: 2025-06-02
Payer: MEDICAID

## 2025-06-02 VITALS — BODY MASS INDEX: 52.42 KG/M2 | WEIGHT: 293 LBS

## 2025-06-02 PROCEDURE — 90791 PSYCH DIAGNOSTIC EVALUATION: CPT | Mod: 95

## 2025-06-05 ENCOUNTER — APPOINTMENT (OUTPATIENT)
Dept: SURGERY | Facility: CLINIC | Age: 25
End: 2025-06-05
Payer: MEDICAID

## 2025-06-05 PROCEDURE — 97803 MED NUTRITION INDIV SUBSEQ: CPT | Mod: NC,95

## 2025-06-06 VITALS — WEIGHT: 293 LBS | BODY MASS INDEX: 48.82 KG/M2 | HEIGHT: 65 IN

## 2025-06-10 ENCOUNTER — OUTPATIENT (OUTPATIENT)
Dept: OUTPATIENT SERVICES | Facility: HOSPITAL | Age: 25
LOS: 1 days | End: 2025-06-10
Payer: MEDICAID

## 2025-06-10 ENCOUNTER — RESULT REVIEW (OUTPATIENT)
Age: 25
End: 2025-06-10

## 2025-06-10 DIAGNOSIS — Z90.3 ACQUIRED ABSENCE OF STOMACH [PART OF]: Chronic | ICD-10-CM

## 2025-06-10 DIAGNOSIS — K21.9 GASTRO-ESOPHAGEAL REFLUX DISEASE WITHOUT ESOPHAGITIS: ICD-10-CM

## 2025-06-10 DIAGNOSIS — Z00.8 ENCOUNTER FOR OTHER GENERAL EXAMINATION: ICD-10-CM

## 2025-06-10 DIAGNOSIS — Z90.89 ACQUIRED ABSENCE OF OTHER ORGANS: Chronic | ICD-10-CM

## 2025-06-10 PROCEDURE — 74246 X-RAY XM UPR GI TRC 2CNTRST: CPT | Mod: 26

## 2025-06-10 PROCEDURE — 74246 X-RAY XM UPR GI TRC 2CNTRST: CPT

## 2025-06-11 DIAGNOSIS — K21.9 GASTRO-ESOPHAGEAL REFLUX DISEASE WITHOUT ESOPHAGITIS: ICD-10-CM

## 2025-06-20 ENCOUNTER — NON-APPOINTMENT (OUTPATIENT)
Age: 25
End: 2025-06-20

## 2025-06-23 ENCOUNTER — APPOINTMENT (OUTPATIENT)
Dept: SURGERY | Facility: CLINIC | Age: 25
End: 2025-06-23
Payer: MEDICAID

## 2025-06-23 PROCEDURE — 97803 MED NUTRITION INDIV SUBSEQ: CPT | Mod: NC,95

## 2025-06-24 ENCOUNTER — OUTPATIENT (OUTPATIENT)
Dept: OUTPATIENT SERVICES | Facility: HOSPITAL | Age: 25
LOS: 1 days | Discharge: HOME | End: 2025-06-24
Payer: COMMERCIAL

## 2025-06-24 ENCOUNTER — RESULT REVIEW (OUTPATIENT)
Age: 25
End: 2025-06-24

## 2025-06-24 ENCOUNTER — TRANSCRIPTION ENCOUNTER (OUTPATIENT)
Age: 25
End: 2025-06-24

## 2025-06-24 VITALS
TEMPERATURE: 98 F | OXYGEN SATURATION: 97 % | HEART RATE: 86 BPM | HEIGHT: 65 IN | WEIGHT: 293 LBS | DIASTOLIC BLOOD PRESSURE: 68 MMHG | RESPIRATION RATE: 17 BRPM | SYSTOLIC BLOOD PRESSURE: 104 MMHG

## 2025-06-24 VITALS — SYSTOLIC BLOOD PRESSURE: 110 MMHG | RESPIRATION RATE: 12 BRPM | HEART RATE: 61 BPM | DIASTOLIC BLOOD PRESSURE: 58 MMHG

## 2025-06-24 DIAGNOSIS — Z98.890 OTHER SPECIFIED POSTPROCEDURAL STATES: Chronic | ICD-10-CM

## 2025-06-24 DIAGNOSIS — K21.9 GASTRO-ESOPHAGEAL REFLUX DISEASE WITHOUT ESOPHAGITIS: ICD-10-CM

## 2025-06-24 DIAGNOSIS — E66.812 OBESITY, CLASS 2: ICD-10-CM

## 2025-06-24 DIAGNOSIS — Z90.89 ACQUIRED ABSENCE OF OTHER ORGANS: Chronic | ICD-10-CM

## 2025-06-24 DIAGNOSIS — Z90.3 ACQUIRED ABSENCE OF STOMACH [PART OF]: Chronic | ICD-10-CM

## 2025-06-24 PROCEDURE — 88305 TISSUE EXAM BY PATHOLOGIST: CPT | Mod: 26

## 2025-06-24 PROCEDURE — 88305 TISSUE EXAM BY PATHOLOGIST: CPT

## 2025-06-24 PROCEDURE — 81025 URINE PREGNANCY TEST: CPT

## 2025-06-24 PROCEDURE — 88312 SPECIAL STAINS GROUP 1: CPT | Mod: 26

## 2025-06-24 PROCEDURE — 88312 SPECIAL STAINS GROUP 1: CPT

## 2025-06-24 PROCEDURE — 43239 EGD BIOPSY SINGLE/MULTIPLE: CPT

## 2025-06-24 RX ORDER — FERROUS SULFATE 137(45) MG
0 TABLET, EXTENDED RELEASE ORAL
Refills: 0 | DISCHARGE

## 2025-06-24 NOTE — ASU DISCHARGE PLAN (ADULT/PEDIATRIC) - FINANCIAL ASSISTANCE
[facility] provides services at a reduced cost to those who are determined to be eligible through [facility]’s financial assistance program. Information regarding [facility]’s financial assistance program can be found by going to [email or web address] or by calling [phone].

## 2025-06-24 NOTE — ASU DISCHARGE PLAN (ADULT/PEDIATRIC) - CARE PROVIDER_API CALL
Sarai Plascencia  Internal Medicine  [address] [place] [address]  Phone: [phone]  Fax: [phone]  Follow Up Time:

## 2025-06-24 NOTE — ASU PATIENT PROFILE, ADULT - NSICDXPASTSURGICALHX_GEN_ALL_CORE_FT
PAST SURGICAL HISTORY:  H/O gastric sleeve     History of ankle surgery LEFT    History of esophagogastroduodenoscopy (EGD)     S/P tonsillectomy and adenoidectomy 2011

## 2025-06-24 NOTE — ASU PREOP CHECKLIST - ASSESSMENT, HISTORY & PHYSICAL COMPLETED AND ON MEDICAL RECORD
none
done
Hydroxychloroquine Counseling:  I discussed with the patient that a baseline ophthalmologic exam is needed at the start of therapy and every year thereafter while on therapy. A CBC may also be warranted for monitoring.  The side effects of this medication were discussed with the patient, including but not limited to agranulocytosis, aplastic anemia, seizures, rashes, retinopathy, and liver toxicity. Patient instructed to call the office should any adverse effect occur.  The patient verbalized understanding of the proper use and possible adverse effects of Plaquenil.  All the patient's questions and concerns were addressed.

## 2025-06-24 NOTE — ASU PATIENT PROFILE, ADULT - FALL HARM RISK - UNIVERSAL INTERVENTIONS
Bed in lowest position, wheels locked, appropriate side rails in place/Call bell, personal items and telephone in reach/Instruct patient to call for assistance before getting out of bed or chair/Non-slip footwear when patient is out of bed/[place] to call system/Physically safe environment - no spills, clutter or unnecessary equipment/Purposeful Proactive Rounding/Room/bathroom lighting operational, light cord in reach

## 2025-06-25 VITALS — BODY MASS INDEX: 48.82 KG/M2 | WEIGHT: 293 LBS | HEIGHT: 65 IN

## 2025-06-25 LAB — SURGICAL PATHOLOGY STUDY: SIGNIFICANT CHANGE UP

## 2025-06-30 DIAGNOSIS — Z01.818 ENCOUNTER FOR OTHER PREPROCEDURAL EXAMINATION: ICD-10-CM

## 2025-06-30 DIAGNOSIS — K29.50 UNSPECIFIED CHRONIC GASTRITIS WITHOUT BLEEDING: ICD-10-CM

## 2025-06-30 DIAGNOSIS — Z98.84 BARIATRIC SURGERY STATUS: ICD-10-CM

## 2025-06-30 DIAGNOSIS — K31.89 OTHER DISEASES OF STOMACH AND DUODENUM: ICD-10-CM

## 2025-06-30 DIAGNOSIS — K44.9 DIAPHRAGMATIC HERNIA WITHOUT OBSTRUCTION OR GANGRENE: ICD-10-CM

## 2025-06-30 DIAGNOSIS — E66.01 MORBID (SEVERE) OBESITY DUE TO EXCESS CALORIES: ICD-10-CM

## 2025-07-18 ENCOUNTER — APPOINTMENT (OUTPATIENT)
Facility: CLINIC | Age: 25
End: 2025-07-18

## 2025-07-21 ENCOUNTER — APPOINTMENT (OUTPATIENT)
Facility: CLINIC | Age: 25
End: 2025-07-21
Payer: COMMERCIAL

## 2025-07-21 VITALS
BODY MASS INDEX: 48.82 KG/M2 | SYSTOLIC BLOOD PRESSURE: 134 MMHG | WEIGHT: 293 LBS | HEIGHT: 65 IN | OXYGEN SATURATION: 98 % | HEART RATE: 54 BPM | DIASTOLIC BLOOD PRESSURE: 85 MMHG

## 2025-07-21 DIAGNOSIS — J45.909 UNSPECIFIED ASTHMA, UNCOMPLICATED: ICD-10-CM

## 2025-07-21 DIAGNOSIS — G47.33 OBSTRUCTIVE SLEEP APNEA (ADULT) (PEDIATRIC): ICD-10-CM

## 2025-07-21 PROCEDURE — 99204 OFFICE O/P NEW MOD 45 MIN: CPT

## 2025-07-21 RX ORDER — BUDESONIDE AND FORMOTEROL FUMARATE DIHYDRATE 80; 4.5 UG/1; UG/1
80-4.5 AEROSOL RESPIRATORY (INHALATION)
Qty: 1 | Refills: 3 | Status: ACTIVE | COMMUNITY
Start: 2025-07-21 | End: 1900-01-01

## 2025-07-21 RX ORDER — ALBUTEROL SULFATE 2.5 MG/3ML
(2.5 MG/3ML) SOLUTION RESPIRATORY (INHALATION)
Qty: 4 | Refills: 3 | Status: ACTIVE | COMMUNITY
Start: 2025-07-21 | End: 1900-01-01

## 2025-07-25 ENCOUNTER — APPOINTMENT (OUTPATIENT)
Dept: SLEEP CENTER | Facility: HOSPITAL | Age: 25
End: 2025-07-25
Payer: COMMERCIAL

## 2025-07-25 ENCOUNTER — OUTPATIENT (OUTPATIENT)
Dept: OUTPATIENT SERVICES | Facility: HOSPITAL | Age: 25
LOS: 1 days | Discharge: HOME | End: 2025-07-25
Payer: COMMERCIAL

## 2025-07-25 DIAGNOSIS — Z90.89 ACQUIRED ABSENCE OF OTHER ORGANS: Chronic | ICD-10-CM

## 2025-07-25 DIAGNOSIS — G47.33 OBSTRUCTIVE SLEEP APNEA (ADULT) (PEDIATRIC): ICD-10-CM

## 2025-07-25 DIAGNOSIS — Z90.3 ACQUIRED ABSENCE OF STOMACH [PART OF]: Chronic | ICD-10-CM

## 2025-07-25 DIAGNOSIS — Z98.890 OTHER SPECIFIED POSTPROCEDURAL STATES: Chronic | ICD-10-CM

## 2025-07-25 PROCEDURE — 95810 POLYSOM 6/> YRS 4/> PARAM: CPT | Mod: 26

## 2025-07-25 PROCEDURE — 95810 POLYSOM 6/> YRS 4/> PARAM: CPT

## 2025-07-29 DIAGNOSIS — G47.33 OBSTRUCTIVE SLEEP APNEA (ADULT) (PEDIATRIC): ICD-10-CM

## 2025-08-12 ENCOUNTER — APPOINTMENT (OUTPATIENT)
Dept: PULMONOLOGY | Facility: HOSPITAL | Age: 25
End: 2025-08-12
Payer: MEDICAID

## 2025-08-12 ENCOUNTER — OUTPATIENT (OUTPATIENT)
Dept: OUTPATIENT SERVICES | Facility: HOSPITAL | Age: 25
LOS: 1 days | End: 2025-08-12
Payer: MEDICAID

## 2025-08-12 DIAGNOSIS — Z90.3 ACQUIRED ABSENCE OF STOMACH [PART OF]: Chronic | ICD-10-CM

## 2025-08-12 DIAGNOSIS — Z90.89 ACQUIRED ABSENCE OF OTHER ORGANS: Chronic | ICD-10-CM

## 2025-08-12 DIAGNOSIS — Z98.890 OTHER SPECIFIED POSTPROCEDURAL STATES: Chronic | ICD-10-CM

## 2025-08-12 DIAGNOSIS — J45.909 UNSPECIFIED ASTHMA, UNCOMPLICATED: ICD-10-CM

## 2025-08-12 PROCEDURE — 94729 DIFFUSING CAPACITY: CPT | Mod: 26

## 2025-08-12 PROCEDURE — 94070 EVALUATION OF WHEEZING: CPT

## 2025-08-12 PROCEDURE — 94727 GAS DIL/WSHOT DETER LNG VOL: CPT | Mod: 26

## 2025-08-12 PROCEDURE — 94729 DIFFUSING CAPACITY: CPT

## 2025-08-12 PROCEDURE — 94726 PLETHYSMOGRAPHY LUNG VOLUMES: CPT

## 2025-08-12 PROCEDURE — 94060 EVALUATION OF WHEEZING: CPT | Mod: 26

## 2025-08-12 PROCEDURE — 94664 DEMO&/EVAL PT USE INHALER: CPT

## 2025-08-12 PROCEDURE — 94727 GAS DIL/WSHOT DETER LNG VOL: CPT

## 2025-08-13 DIAGNOSIS — J45.909 UNSPECIFIED ASTHMA, UNCOMPLICATED: ICD-10-CM

## 2025-08-25 ENCOUNTER — APPOINTMENT (OUTPATIENT)
Facility: CLINIC | Age: 25
End: 2025-08-25